# Patient Record
Sex: FEMALE | Race: WHITE | NOT HISPANIC OR LATINO | Employment: UNEMPLOYED | URBAN - NONMETROPOLITAN AREA
[De-identification: names, ages, dates, MRNs, and addresses within clinical notes are randomized per-mention and may not be internally consistent; named-entity substitution may affect disease eponyms.]

---

## 2024-05-18 ENCOUNTER — PREP FOR SURGERY (OUTPATIENT)
Dept: OTHER | Facility: HOSPITAL | Age: 54
End: 2024-05-18

## 2024-05-18 RX ORDER — SODIUM CHLORIDE 0.9 % (FLUSH) 0.9 %
10 SYRINGE (ML) INJECTION AS NEEDED
Status: CANCELLED | OUTPATIENT
Start: 2024-05-18

## 2024-05-18 RX ORDER — ATORVASTATIN CALCIUM 40 MG/1
80 TABLET, FILM COATED ORAL NIGHTLY
Status: CANCELLED | OUTPATIENT
Start: 2024-05-19

## 2024-05-18 RX ORDER — SODIUM CHLORIDE 0.9 % (FLUSH) 0.9 %
10 SYRINGE (ML) INJECTION EVERY 12 HOURS SCHEDULED
Status: CANCELLED | OUTPATIENT
Start: 2024-05-19

## 2024-05-18 RX ORDER — SODIUM CHLORIDE 9 MG/ML
40 INJECTION, SOLUTION INTRAVENOUS AS NEEDED
Status: CANCELLED | OUTPATIENT
Start: 2024-05-18

## 2024-05-18 RX ORDER — ASPIRIN 325 MG
325 TABLET ORAL DAILY
Status: CANCELLED | OUTPATIENT
Start: 2024-05-19

## 2024-05-18 RX ORDER — ASPIRIN 300 MG/1
300 SUPPOSITORY RECTAL DAILY
Status: CANCELLED | OUTPATIENT
Start: 2024-05-19

## 2024-05-19 ENCOUNTER — APPOINTMENT (OUTPATIENT)
Dept: CARDIOLOGY | Facility: HOSPITAL | Age: 54
End: 2024-05-19

## 2024-05-19 ENCOUNTER — APPOINTMENT (OUTPATIENT)
Dept: OTHER | Facility: HOSPITAL | Age: 54
End: 2024-05-19

## 2024-05-19 ENCOUNTER — HOSPITAL ENCOUNTER (INPATIENT)
Facility: HOSPITAL | Age: 54
LOS: 1 days | Discharge: HOME | End: 2024-05-20
Attending: INTERNAL MEDICINE | Admitting: INTERNAL MEDICINE

## 2024-05-19 ENCOUNTER — APPOINTMENT (OUTPATIENT)
Dept: CT IMAGING | Facility: HOSPITAL | Age: 54
End: 2024-05-19

## 2024-05-19 ENCOUNTER — APPOINTMENT (OUTPATIENT)
Dept: MRI IMAGING | Facility: HOSPITAL | Age: 54
End: 2024-05-19

## 2024-05-19 DIAGNOSIS — G45.9 TIA (TRANSIENT ISCHEMIC ATTACK): Primary | ICD-10-CM

## 2024-05-19 PROBLEM — I63.9 CVA (CEREBRAL VASCULAR ACCIDENT): Status: ACTIVE | Noted: 2024-05-19

## 2024-05-19 LAB
ALBUMIN SERPL-MCNC: 4.1 G/DL (ref 3.5–5.2)
ALBUMIN/GLOB SERPL: 1.6 G/DL
ALP SERPL-CCNC: 95 U/L (ref 39–117)
ALT SERPL W P-5'-P-CCNC: 10 U/L (ref 1–33)
ANION GAP SERPL CALCULATED.3IONS-SCNC: 9.1 MMOL/L (ref 5–15)
AST SERPL-CCNC: 12 U/L (ref 1–32)
BASOPHILS # BLD AUTO: 0.04 10*3/MM3 (ref 0–0.2)
BASOPHILS NFR BLD AUTO: 0.7 % (ref 0–1.5)
BH CV ECHO MEAS - AO MAX PG: 13.2 MMHG
BH CV ECHO MEAS - AO MEAN PG: 6 MMHG
BH CV ECHO MEAS - AO ROOT DIAM: 2.7 CM
BH CV ECHO MEAS - AO V2 MAX: 182 CM/SEC
BH CV ECHO MEAS - AO V2 VTI: 38.7 CM
BH CV ECHO MEAS - AVA(I,D): 2.01 CM2
BH CV ECHO MEAS - EDV(CUBED): 68.4 ML
BH CV ECHO MEAS - EDV(MOD-SP4): 67 ML
BH CV ECHO MEAS - EF(MOD-SP4): 70.1 %
BH CV ECHO MEAS - ESV(CUBED): 13.3 ML
BH CV ECHO MEAS - ESV(MOD-SP4): 20 ML
BH CV ECHO MEAS - FS: 42.1 %
BH CV ECHO MEAS - IVS/LVPW: 0.82 CM
BH CV ECHO MEAS - IVSD: 0.63 CM
BH CV ECHO MEAS - LA DIMENSION: 3.1 CM
BH CV ECHO MEAS - LV DIASTOLIC VOL/BSA (35-75): 40.6 CM2
BH CV ECHO MEAS - LV MASS(C)D: 81.7 GRAMS
BH CV ECHO MEAS - LV MAX PG: 4.2 MMHG
BH CV ECHO MEAS - LV MEAN PG: 2 MMHG
BH CV ECHO MEAS - LV SYSTOLIC VOL/BSA (12-30): 12.1 CM2
BH CV ECHO MEAS - LV V1 MAX: 102 CM/SEC
BH CV ECHO MEAS - LV V1 VTI: 22.5 CM
BH CV ECHO MEAS - LVIDD: 4.1 CM
BH CV ECHO MEAS - LVIDS: 2.37 CM
BH CV ECHO MEAS - LVOT AREA: 3.5 CM2
BH CV ECHO MEAS - LVOT DIAM: 2.1 CM
BH CV ECHO MEAS - LVPWD: 0.77 CM
BH CV ECHO MEAS - MV A MAX VEL: 56.6 CM/SEC
BH CV ECHO MEAS - MV DEC SLOPE: 306 CM/SEC2
BH CV ECHO MEAS - MV DEC TIME: 0.28 SEC
BH CV ECHO MEAS - MV E MAX VEL: 84.4 CM/SEC
BH CV ECHO MEAS - MV E/A: 1.49
BH CV ECHO MEAS - PA ACC TIME: 0.2 SEC
BH CV ECHO MEAS - SV(LVOT): 77.9 ML
BH CV ECHO MEAS - SV(MOD-SP4): 47 ML
BH CV ECHO MEAS - SVI(LVOT): 47.2 ML/M2
BH CV ECHO MEAS - SVI(MOD-SP4): 28.5 ML/M2
BH CV ECHO MEAS - TAPSE (>1.6): 3.2 CM
BH CV ECHO MEAS - TR MAX PG: 26 MMHG
BH CV ECHO MEAS - TR MAX VEL: 255 CM/SEC
BILIRUB SERPL-MCNC: 0.2 MG/DL (ref 0–1.2)
BUN SERPL-MCNC: 11 MG/DL (ref 6–20)
BUN/CREAT SERPL: 13.1 (ref 7–25)
CALCIUM SPEC-SCNC: 9.1 MG/DL (ref 8.6–10.5)
CHLORIDE SERPL-SCNC: 108 MMOL/L (ref 98–107)
CHOLEST SERPL-MCNC: 198 MG/DL (ref 0–200)
CO2 SERPL-SCNC: 24.9 MMOL/L (ref 22–29)
CREAT SERPL-MCNC: 0.84 MG/DL (ref 0.57–1)
CRP SERPL-MCNC: 0.35 MG/DL (ref 0–0.5)
DEPRECATED RDW RBC AUTO: 41.4 FL (ref 37–54)
EGFRCR SERPLBLD CKD-EPI 2021: 81.7 ML/MIN/1.73
EOSINOPHIL # BLD AUTO: 0.15 10*3/MM3 (ref 0–0.4)
EOSINOPHIL NFR BLD AUTO: 2.5 % (ref 0.3–6.2)
ERYTHROCYTE [DISTWIDTH] IN BLOOD BY AUTOMATED COUNT: 12.6 % (ref 12.3–15.4)
ERYTHROCYTE [SEDIMENTATION RATE] IN BLOOD: 13 MM/HR (ref 0–30)
GLOBULIN UR ELPH-MCNC: 2.5 GM/DL
GLUCOSE BLDC GLUCOMTR-MCNC: 153 MG/DL (ref 70–130)
GLUCOSE BLDC GLUCOMTR-MCNC: 90 MG/DL (ref 70–130)
GLUCOSE BLDC GLUCOMTR-MCNC: 93 MG/DL (ref 70–130)
GLUCOSE SERPL-MCNC: 178 MG/DL (ref 65–99)
HBA1C MFR BLD: 5.6 % (ref 4.8–5.6)
HCT VFR BLD AUTO: 40.3 % (ref 34–46.6)
HDLC SERPL-MCNC: 38 MG/DL (ref 40–60)
HGB BLD-MCNC: 13.2 G/DL (ref 12–15.9)
IMM GRANULOCYTES # BLD AUTO: 0.04 10*3/MM3 (ref 0–0.05)
IMM GRANULOCYTES NFR BLD AUTO: 0.7 % (ref 0–0.5)
LDLC SERPL CALC-MCNC: 134 MG/DL (ref 0–100)
LDLC/HDLC SERPL: 3.46 {RATIO}
LYMPHOCYTES # BLD AUTO: 2.01 10*3/MM3 (ref 0.7–3.1)
LYMPHOCYTES NFR BLD AUTO: 33.5 % (ref 19.6–45.3)
MCH RBC QN AUTO: 29.1 PG (ref 26.6–33)
MCHC RBC AUTO-ENTMCNC: 32.8 G/DL (ref 31.5–35.7)
MCV RBC AUTO: 88.8 FL (ref 79–97)
MONOCYTES # BLD AUTO: 0.22 10*3/MM3 (ref 0.1–0.9)
MONOCYTES NFR BLD AUTO: 3.7 % (ref 5–12)
NEUTROPHILS NFR BLD AUTO: 3.54 10*3/MM3 (ref 1.7–7)
NEUTROPHILS NFR BLD AUTO: 58.9 % (ref 42.7–76)
NRBC BLD AUTO-RTO: 0 /100 WBC (ref 0–0.2)
PLATELET # BLD AUTO: 179 10*3/MM3 (ref 140–450)
PMV BLD AUTO: 11.1 FL (ref 6–12)
POTASSIUM SERPL-SCNC: 3.8 MMOL/L (ref 3.5–5.2)
PROT SERPL-MCNC: 6.6 G/DL (ref 6–8.5)
QT INTERVAL: 480 MS
QTC INTERVAL: 446 MS
RBC # BLD AUTO: 4.54 10*6/MM3 (ref 3.77–5.28)
SODIUM SERPL-SCNC: 142 MMOL/L (ref 136–145)
TRIGL SERPL-MCNC: 142 MG/DL (ref 0–150)
TSH SERPL DL<=0.05 MIU/L-ACNC: 2.08 UIU/ML (ref 0.27–4.2)
VLDLC SERPL-MCNC: 26 MG/DL (ref 5–40)
WBC NRBC COR # BLD AUTO: 6 10*3/MM3 (ref 3.4–10.8)

## 2024-05-19 PROCEDURE — 82948 REAGENT STRIP/BLOOD GLUCOSE: CPT

## 2024-05-19 PROCEDURE — 85025 COMPLETE CBC W/AUTO DIFF WBC: CPT | Performed by: INTERNAL MEDICINE

## 2024-05-19 PROCEDURE — 93306 TTE W/DOPPLER COMPLETE: CPT

## 2024-05-19 PROCEDURE — 86140 C-REACTIVE PROTEIN: CPT | Performed by: INTERNAL MEDICINE

## 2024-05-19 PROCEDURE — 82746 ASSAY OF FOLIC ACID SERUM: CPT | Performed by: INTERNAL MEDICINE

## 2024-05-19 PROCEDURE — 94799 UNLISTED PULMONARY SVC/PX: CPT

## 2024-05-19 PROCEDURE — 99223 1ST HOSP IP/OBS HIGH 75: CPT | Performed by: INTERNAL MEDICINE

## 2024-05-19 PROCEDURE — 80061 LIPID PANEL: CPT | Performed by: INTERNAL MEDICINE

## 2024-05-19 PROCEDURE — 80053 COMPREHEN METABOLIC PANEL: CPT | Performed by: INTERNAL MEDICINE

## 2024-05-19 PROCEDURE — 93005 ELECTROCARDIOGRAM TRACING: CPT | Performed by: INTERNAL MEDICINE

## 2024-05-19 PROCEDURE — 82607 VITAMIN B-12: CPT | Performed by: INTERNAL MEDICINE

## 2024-05-19 PROCEDURE — 70551 MRI BRAIN STEM W/O DYE: CPT

## 2024-05-19 PROCEDURE — 83036 HEMOGLOBIN GLYCOSYLATED A1C: CPT | Performed by: INTERNAL MEDICINE

## 2024-05-19 PROCEDURE — 85652 RBC SED RATE AUTOMATED: CPT | Performed by: INTERNAL MEDICINE

## 2024-05-19 PROCEDURE — 84443 ASSAY THYROID STIM HORMONE: CPT | Performed by: INTERNAL MEDICINE

## 2024-05-19 PROCEDURE — 93010 ELECTROCARDIOGRAM REPORT: CPT | Performed by: INTERNAL MEDICINE

## 2024-05-19 PROCEDURE — 99222 1ST HOSP IP/OBS MODERATE 55: CPT | Performed by: PSYCHIATRY & NEUROLOGY

## 2024-05-19 PROCEDURE — 70450 CT HEAD/BRAIN W/O DYE: CPT

## 2024-05-19 PROCEDURE — 94761 N-INVAS EAR/PLS OXIMETRY MLT: CPT

## 2024-05-19 PROCEDURE — 93306 TTE W/DOPPLER COMPLETE: CPT | Performed by: INTERNAL MEDICINE

## 2024-05-19 RX ORDER — SODIUM CHLORIDE 0.9 % (FLUSH) 0.9 %
10 SYRINGE (ML) INJECTION AS NEEDED
Status: DISCONTINUED | OUTPATIENT
Start: 2024-05-19 | End: 2024-05-20 | Stop reason: HOSPADM

## 2024-05-19 RX ORDER — BUTALBITAL, ACETAMINOPHEN AND CAFFEINE 50; 325; 40 MG/1; MG/1; MG/1
1 TABLET ORAL EVERY 4 HOURS PRN
Status: COMPLETED | OUTPATIENT
Start: 2024-05-19 | End: 2024-05-19

## 2024-05-19 RX ORDER — BUTALBITAL, ACETAMINOPHEN AND CAFFEINE 50; 325; 40 MG/1; MG/1; MG/1
1 TABLET ORAL EVERY 6 HOURS PRN
Status: DISCONTINUED | OUTPATIENT
Start: 2024-05-19 | End: 2024-05-20 | Stop reason: HOSPADM

## 2024-05-19 RX ORDER — SODIUM CHLORIDE 0.9 % (FLUSH) 0.9 %
10 SYRINGE (ML) INJECTION EVERY 12 HOURS SCHEDULED
Status: DISCONTINUED | OUTPATIENT
Start: 2024-05-19 | End: 2024-05-20 | Stop reason: HOSPADM

## 2024-05-19 RX ORDER — ASPIRIN 300 MG/1
300 SUPPOSITORY RECTAL DAILY
Status: DISCONTINUED | OUTPATIENT
Start: 2024-05-20 | End: 2024-05-20

## 2024-05-19 RX ORDER — LISINOPRIL 2.5 MG/1
5 TABLET ORAL
Status: DISCONTINUED | OUTPATIENT
Start: 2024-05-19 | End: 2024-05-20 | Stop reason: HOSPADM

## 2024-05-19 RX ORDER — ACETAMINOPHEN 500 MG
1000 TABLET ORAL ONCE
Status: COMPLETED | OUTPATIENT
Start: 2024-05-19 | End: 2024-05-19

## 2024-05-19 RX ORDER — FAMOTIDINE 20 MG/1
20 TABLET, FILM COATED ORAL
Status: DISCONTINUED | OUTPATIENT
Start: 2024-05-19 | End: 2024-05-20 | Stop reason: HOSPADM

## 2024-05-19 RX ORDER — ASPIRIN 325 MG
325 TABLET ORAL DAILY
Status: DISCONTINUED | OUTPATIENT
Start: 2024-05-20 | End: 2024-05-20

## 2024-05-19 RX ORDER — FAMOTIDINE 10 MG/ML
20 INJECTION, SOLUTION INTRAVENOUS DAILY
Status: DISCONTINUED | OUTPATIENT
Start: 2024-05-19 | End: 2024-05-19

## 2024-05-19 RX ORDER — ATORVASTATIN CALCIUM 40 MG/1
80 TABLET, FILM COATED ORAL NIGHTLY
Status: DISCONTINUED | OUTPATIENT
Start: 2024-05-19 | End: 2024-05-20 | Stop reason: HOSPADM

## 2024-05-19 RX ORDER — LISINOPRIL 2.5 MG/1
5 TABLET ORAL
Status: DISCONTINUED | OUTPATIENT
Start: 2024-05-19 | End: 2024-05-19

## 2024-05-19 RX ORDER — SODIUM CHLORIDE 9 MG/ML
40 INJECTION, SOLUTION INTRAVENOUS AS NEEDED
Status: DISCONTINUED | OUTPATIENT
Start: 2024-05-19 | End: 2024-05-20 | Stop reason: HOSPADM

## 2024-05-19 RX ORDER — AMLODIPINE BESYLATE 5 MG/1
5 TABLET ORAL
Status: DISCONTINUED | OUTPATIENT
Start: 2024-05-19 | End: 2024-05-20 | Stop reason: HOSPADM

## 2024-05-19 RX ADMIN — FAMOTIDINE 20 MG: 20 TABLET, FILM COATED ORAL at 17:54

## 2024-05-19 RX ADMIN — ACETAMINOPHEN 1000 MG: 500 TABLET ORAL at 06:43

## 2024-05-19 RX ADMIN — Medication 10 ML: at 20:20

## 2024-05-19 RX ADMIN — FAMOTIDINE 20 MG: 20 TABLET, FILM COATED ORAL at 06:43

## 2024-05-19 RX ADMIN — Medication 10 ML: at 08:32

## 2024-05-19 RX ADMIN — LISINOPRIL 5 MG: 2.5 TABLET ORAL at 20:20

## 2024-05-19 RX ADMIN — ATORVASTATIN CALCIUM 80 MG: 40 TABLET, FILM COATED ORAL at 20:20

## 2024-05-19 RX ADMIN — AMLODIPINE BESYLATE 5 MG: 5 TABLET ORAL at 17:54

## 2024-05-19 RX ADMIN — BUTALBITAL, ACETAMINOPHEN AND CAFFEINE 1 TABLET: 325; 50; 40 TABLET ORAL at 12:03

## 2024-05-19 NOTE — PROGRESS NOTES
Patient acutely had blurry vision, slurred speech, worse left sided weakness while sitting in bedside chair. Blood pressure had acutely shot up to 170's/110's. Code stroke called. STAT CT and recently obtained MRI reviewed with tele neruology. Both exams did not show ischemic stroke or hemorrhage. Possibly related to BP. No headache to suspect migraine. Will start amlodipine with goal -140 systolic per neuro. Currently 134/86. Symptoms resolved while gone to CT with exam returned to same as this morning.

## 2024-05-19 NOTE — PROGRESS NOTES
Patient admitted after midnight by Dr. Celaya. I have seen and evaluated patient this morning.     Ms. Meyers is our 53 yo F with hx COPD, HTN, tobacco use, distant hx of leukemia in remission who presented with left sided weakness, vision changes starting around 5 pm yesterday. She presented to OSH and received TNK per neurology recommendations around 6:30 pm. Patient also significantly hypertensive at OSH on presentation. Patient with headache as well. Headache initially resolved but has returned less in severity. Blood pressure better controlled. History consistent with right sided CVA. Would suspect complex migraine associated weakness to improve with improved migraine and not be as persistent. History also not consistent with hypertensive encephalopathy, but suspect uncontrolled HTN did put patient at increased risk for CVA. CT/CTA being uploaded currently for our tele-neurology to review at their request. We have repeat labs and CT pending for 6:30 pm. Neurology consulted, appreciate recs. Will continue to monitor in CCU as per post-TNK protocol. PT/OT consulted and will evaluate patient tomorrow. On my evaluation today patient still with similar weakness on left side, 3-5/5 in upper and lower extremities, to prior exams. L sided facial droop very mild. Continue neuro and NIHSS as per post-TNK protocol.

## 2024-05-19 NOTE — PROGRESS NOTES
Nurse Practitioner - Brief Progress Note  PERMANENT  05/19/2024 03:38    [facility] [place] - [facility] - [place], [place] ([facility])    DONALD DERAS    Date of Service 05/19/2024 03:38    HPI/Events of Note [facility] Provider Assessment Note    54-year-old female admitted status post TNK for presumptive ischemic CVA.  Originally presented to an outside hospital with complaints near-syncope and left arm weakness.  Found to have left pronator drift, NIH 5 at OSH.  PMH:  Asthma/COPD, HTN    Hospital course:  Patient was given TNK at OSH with some symptom improvement.  CT head unremarkable, discussed with tele neurology.    On video assessment, the patient is HURD, no acute distress.  Resting comfortably    Assessment and Plan:    --presumptive ischemic CVA  S/P TNK administration  Serial neuro exams  Echocardiogram, repeat CT brain  Tele neurology to see  Bleeding precautions      _y_   Video Assessment performed  _y_   Most recent labs reviewed  _y_   Vital Signs reviewed  _y_   Best Practices addressed:                 VTE prophylaxis:scd                 SUP (when indicated):                 Current Glucose:265                      Please notify bedside physician when present or [facility] if glc > 180 X 2                 Sepsis guidelines: n/a                 Lung protective strategy: n/a                 Targeted Temperature Management:n/a    Note drafted by SIMA Day-BC    Contact [facility] for any needs if bedside physician is not present.      Interventions Intermediate-Other: presumptive ischemic CVA - eval/mangement        Electronically Signed by: Yunior Gibson (NP) on 05/19/2024 04:00    Annotated By: Giovanni Neri)    Date: 05/19/24 04:27  Agree with assessment and plan as outlined above

## 2024-05-19 NOTE — NURSING NOTE
Pt called out with new complaints of blurred vision.  Pt had worsening left sided weakness and was aphasic on assessment. Blood glucose was obtained and code stroke was called.  Dr. Flores and stroke team at bedside at 1645.

## 2024-05-19 NOTE — PLAN OF CARE
Problem: Adult Inpatient Plan of Care  Goal: Plan of Care Review  Outcome: Ongoing, Progressing  Flowsheets (Taken 5/19/2024 0542)  Progress: no change  Plan of Care Reviewed With: patient  Outcome Evaluation: patient alert and oriented. NIHSS scale of 2, no acute distress noted, patient denies any needs. VSS, WCTM  Goal: Patient-Specific Goal (Individualized)  Outcome: Ongoing, Progressing  Goal: Absence of Hospital-Acquired Illness or Injury  Outcome: Ongoing, Progressing  Intervention: Identify and Manage Fall Risk  Recent Flowsheet Documentation  Taken 5/19/2024 0400 by Donald Corral RN  Safety Promotion/Fall Prevention:   fall prevention program maintained   safety round/check completed  Taken 5/19/2024 0300 by Donald Corral RN  Safety Promotion/Fall Prevention:   fall prevention program maintained   safety round/check completed  Taken 5/19/2024 0215 by Donald Corral RN  Safety Promotion/Fall Prevention:   fall prevention program maintained   safety round/check completed  Intervention: Prevent Skin Injury  Recent Flowsheet Documentation  Taken 5/19/2024 0400 by Donald Corral RN  Body Position: position changed independently  Taken 5/19/2024 0215 by Donald Corral RN  Body Position: (education provided regarding importance of turning and repositioning to maintain skin integerity, patient reports understanding)   position changed independently   other (see comments)  Skin Protection:   adhesive use limited   incontinence pads utilized   skin-to-device areas padded   skin-to-skin areas padded   transparent dressing maintained   tubing/devices free from skin contact  Intervention: Prevent and Manage VTE (Venous Thromboembolism) Risk  Recent Flowsheet Documentation  Taken 5/19/2024 0215 by Donald Corral RN  Activity Management: bedrest  VTE Prevention/Management:   bilateral   sequential compression devices on  Intervention: Prevent Infection  Recent Flowsheet Documentation  Taken 5/19/2024 0215 by Ellis  Donald, RN  Infection Prevention:   equipment surfaces disinfected   hand hygiene promoted   personal protective equipment utilized   rest/sleep promoted   single patient room provided   visitors restricted/screened  Goal: Optimal Comfort and Wellbeing  Outcome: Ongoing, Progressing  Intervention: Monitor Pain and Promote Comfort  Recent Flowsheet Documentation  Taken 5/19/2024 0215 by Donald Corral, RN  Pain Management Interventions:   care clustered   pillow support provided   position adjusted   see MAR   quiet environment facilitated  Intervention: Provide Person-Centered Care  Recent Flowsheet Documentation  Taken 5/19/2024 0215 by Donald Corral, RN  Trust Relationship/Rapport:   care explained   choices provided  Goal: Readiness for Transition of Care  Outcome: Ongoing, Progressing  Intervention: Mutually Develop Transition Plan  Recent Flowsheet Documentation  Taken 5/19/2024 0310 by Donald Corral, RN  Transportation Anticipated: family or friend will provide  Patient/Family Anticipated Services at Transition: none  Patient/Family Anticipates Transition to: home  Taken 5/19/2024 0303 by Donald Corral, RN  Equipment Currently Used at Home:   oxygen   pulse ox   bp cuff   cane, straight   nebulizer   Goal Outcome Evaluation:  Plan of Care Reviewed With: patient        Progress: no change  Outcome Evaluation: patient alert and oriented. NIHSS scale of 2, no acute distress noted, patient denies any needs. VSS, WCTM

## 2024-05-19 NOTE — CONSULTS
Stroke Consult Note    Patient Name: Diane Meyers   MRN: [id number]  Age: 54 y.o.  Sex: female  : 1970    Primary Care Physician: Provider, No Known  Referring Physician:  Chevy Betancourt MD        Chief Complaint/Reason for Consultation: left sided weakness     Subjective .  HPI: 54-year-old female admitted status post TNK for presumptive ischemic CVA. Originally presented to an outside hospital with complaints near-syncope and left arm weakness. Found to have left pronator drift, NIH 5 at OSH.     At work, left arm dropped to her side, no feeling, vision blurred, she is talking but was not making any sense.       I was called on  for transfer. Based on the history    Last Known Normal Date/Time:   5 PM EST     Review of Systems   Constitutional: No fatigue  HENT: Negative for nosebleeds and rhinorrhea.    Eyes: Negative for redness.   Respiratory: Negative for cough.    Gastrointestinal: Negative for anal bleeding.   Endocrine: Negative for polydipsia.   Genitourinary: Negative for enuresis and urgency.   Musculoskeletal: Negative for joint swelling.   Neurological: Negative for tremors.   Psychiatric/Behavioral: Negative for hallucinations.      Temp:  [97.9 °F (36.6 °C)-98.1 °F (36.7 °C)] 97.9 °F (36.6 °C)  Heart Rate:  [48-64] 54  Resp:  [12-20] 16  BP: (133-154)/(68-92) 148/77        NEURO( Exam is performed with help of hospital staff at bed side and observed by me via audio-video interface)    MENTAL STATUS: AAOx3, memory intact, fund of knowledge appropriate    LANG/SPEECH: Naming and repetition intact, fluent, follows 3-step commands    CRANIAL NERVES:    Pupils equal and reactive, EOMI intact, no gaze deviation, no nystagmus  No facial droop, cough and gag intact, shoulder shrug intact, tongue midline     MOTOR:  Moves all extremities equally- subjective feels weak- left arm -  less     SENSORY: Normal to light touch all throughout     COORDINATION: incoordination on the  left.      STATION: Not assessed due to patient condition    GAIT: Not assessed due to patient condition     Acute Stroke Data    Thrombolytic Inclusion / Exclusion Criteria    Time: 09:36 EDT  Person Administering Scale: Jefferson Bo MD    Inclusion Criteria  [x]   18 years of age or greater   []   Onset of symptoms < 4.5 hours before beginning treatment (stroke onset = time patient was last seen well or without symptoms).   []   Diagnosis of acute ischemic stroke causing measurable disabling deficit (Complete Hemianopia, Any Aphasia, Visual or Sensory Extinction, Any weakness limiting sustained effort against gravity)   []   Any remaining deficit considered potentially disabling in view of patient and practitioner   Exclusion criteria (Do not proceed with thrombolytic if any are checked under exclusion criteria)  []   Onset unknown or GREATER than 4.5 hours   []   ICH on CT/MRI   []   CT demonstrates hypodensity representing acute or subacute infarct   []   Significant head trauma or prior stroke in the previous 3 months   []   Symptoms suggestive of subarachnoid hemorrhage   []   History of un-ruptured intracranial aneurysm GREATER than 10 mm   []   Recent intracranial or intraspinal surgery within the last 3 months   []   Arterial puncture at a non-compressible site in the previous 7 days   []   Active internal bleeding   []   Acute bleeding tendency   []   Platelet count LESS than 100,000 for known hematological diseases such as leukemia, thrombocytopenia or chronic cirrhosis   []   Current use of anticoagulant with INR GREATER than 1.7 or PT GREATER than 15 seconds, aPTT GREATER than 40 seconds   []   Heparin received within 48 hours, resulting in abnormally elevated aPTT GREATER than upper limit of normal   []   Current use of direct thrombin inhibitors or direct factor Xa inhibitors in the past 48 hours   []   Elevated blood pressure refractory to treatment (systolic GREATER than 185 mm/Hg or diastolic   GREATER than 110 mm/Hg   []   Suspected infective endocarditis and aortic arch dissection   []   Current use of therapeutic treatment dose of low-molecular-weight heparin (LMWH) within the previous 24 hours   []   Structural GI malignancy or bleed   Relative exclusion for all patients  []   Only minor non-disabling symptoms   []   Pregnancy   []   Seizure at onset with postictal residual neurological impairments   []   Major surgery or previous trauma within past 14 days   []   History of previous spontaneous ICH, intracranial neoplasm, or AV malformation   []   Postpartum (within previous 14 days)   []   Recent GI or urinary tract hemorrhage (within previous 21 days)   []   Recent acute MI (within previous 3 months)   []   History of un-ruptured intracranial aneurysm LESS than 10 mm   []   History of ruptured intracranial aneurysm   []   Blood glucose LESS than 50 mg/dL (2.7 mmol/L)   []   Dural puncture within the last 7 days   []   Known GREATER than 10 cerebral microbleeds   Additional exclusions for patients with symptoms onset between 3 and 4.5 hours.  []   Age > 80.   []   On any anticoagulants regardless of INR  >>> Warfarin (Coumadin), Heparin, Enoxaparin (Lovenox), fondaparinux (Arixtra), bivalirudin (Angiomax), Argatroban, dabigatran (Pradaxa), rivaroxaban (Xarelto), or apixaban (Eliquis)   []   Severe stroke (NIHSS > 25).   []   History of BOTH diabetes and previous ischemic stroke.   []   The risks and benefits have been discussed with the patient or family related to the administration of IV thrombolytic for stroke symptoms.   []   I have discussed and reviewed the patient's case and imaging with the attending prior to IV thrombolytic.    Time thrombolytic administered     TNK given at OSH  Past Medical History:   Diagnosis Date    Asthma     COPD (chronic obstructive pulmonary disease)     Hypertension      Past Surgical History:   Procedure Laterality Date    APPENDECTOMY       SECTION       "x2     Family History   Problem Relation Age of Onset    Hypertension Father      Social History     Socioeconomic History    Marital status: Single   Tobacco Use    Smoking status: Every Day     Current packs/day: 1.00     Average packs/day: 1 pack/day for 39.4 years (39.4 ttl pk-yrs)     Types: Cigarettes     Start date:      Passive exposure: Current    Smokeless tobacco: Never   Vaping Use    Vaping status: Never Used   Substance and Sexual Activity    Alcohol use: Never    Drug use: Yes     Frequency: 2.0 times per week     Types: Marijuana     Comment: \"gummies for anxiety\"    Sexual activity: Defer     No Known Allergies  Prior to Admission medications    Not on File       Hospital Meds:  Scheduled- [START ON 2024] aspirin, 325 mg, Oral, Daily   Or  [START ON 2024] aspirin, 300 mg, Rectal, Daily  atorvastatin, 80 mg, Oral, Nightly  famotidine, 20 mg, Oral, BID AC  sodium chloride, 10 mL, Intravenous, Q12H  sodium chloride, 10 mL, Intravenous, Q12H      Infusions-     PRNs-   ipratropium    sodium chloride    sodium chloride    sodium chloride    sodium chloride    Functional Status Prior to Current Stroke/[place] Score: 0    NIH Stroke Scale  Time: 09:36 EDT  Person Administering Scale: Jefferson Bo MD    1a  Level of consciousness: 0=alert; keenly responsive   1b. LOC questions:  0=Performs both tasks correctly   1c. LOC commands: 0=Performs both tasks correctly   2.  Best Gaze: 0=normal   3.  Visual: 0=No visual loss   4. Facial Palsy: 0=Normal symmetric movement   5a.  Motor left arm: 1=Drift, limb holds 90 (or 45) degrees but drifts down before full 10 seconds: does not hit bed   5b.  Motor right arm: 0=No drift, limb holds 90 (or 45) degrees for full 10 seconds   6a. motor left le=No drift, limb holds 90 (or 45) degrees for full 10 seconds   6b  Motor right le=No drift, limb holds 90 (or 45) degrees for full 10 seconds   7. Limb Ataxia: 1=Present in one limb   8.  Sensory: " 0=Normal; no sensory loss   9. Best Language:  0=No aphasia, normal   10. Dysarthria: 0=Normal   11. Extinction and Inattention: 0=No abnormality    Total:   2       Results Reviewed:  I have personally reviewed current lab, radiology, and data and agree with results.        Assessment/Plan:  Left sided weakness s/p TNK at OSH-transferred to [place] for further management.- localizable to right cerebral hemisphere.   Tobacco abuse- counseled on cessation, willing to quit.   Hypertension- new diagnosis   Hyperlipidemia       Plan     CTA- mild intra and extracranial athero   Okay for aspirin 325 after 24 hr stable CT scan   Okay for Lipitor 80 daily  Systolic BP - 120-160  Mobilize the patient   MRI brain and TTE pending.       Likely discharge tomorrow.   Tele-stroke will continue to follow the patient.           Jefferson Bo MD  May 19, 2024  09:36 EDT    Verbal consent taken.  Patient agreeable to be seen via telemedicine.    This was an audio and video enabled telemedicine encounter.

## 2024-05-19 NOTE — PLAN OF CARE
Goal Outcome Evaluation:           Progress: no change  Outcome Evaluation: Pt resting in bed. Remains alert and oriented. Pt states that blurred vision has improved & is no longer aphasic.  Pt does still have left sided weakness but has improved.  VSS.  No current complaints at this time.

## 2024-05-19 NOTE — H&P
"[facility]   HISTORY AND PHYSICAL    Patient Name: Diane Meyers  : 1970  MRN: [id number]  Primary Care Physician:  Mychal, No Known  Date of admission: 2024    Subjective   Subjective     Chief Complaint: L weakness, aphasia    History of Present Illness the patient is a 54-year-old female, left-hand-dominant, with past medical history significant for COPD, hypertension on no home medications and tobacco use who presents in transfer for left-sided weakness, visual changes and aphasia.    Patient seen and examined in CCU bed 2.  The patient states that she reported for work on 2024 at 4 PM.  She states that at 5 PM she was sweeping with a broom and had the acute onset of left upper extremity weakness.  She states that she felt as if her left arm was \"dead.\"  She dropped the broom and had complete loss of function of the left hand and left upper extremity.  She then reports bilateral visual loss.  She states that it was as she was looking through a kaleidoscope.  She states that she also had some expressive aphasia as she was trying to explain to her boss what was happening and her boss did not understand what she was saying.  The patient states that the symptoms lasted in total for approximately 7 to 10 minutes.  Since that time, the patient has had ongoing but stable left-sided weakness.    Patient states that on the morning of 2024 she did wake up with what she describes as a migraine headache.  She states that she does not routinely have headaches.  She states that either prior to or during the left upper extremity weakness, she did experience tendinitis and dizziness as well.  The patient states that she attempted to get low to the ground because she did not want to fall.    Patient denies any previous/similar symptoms.  She denies any recent fevers or chills.  No nausea or vomiting, shortness of air or chest pains.  The patient denies any history of syncope.    According to record " review from the outside hospital, patient presented with the abrupt onset of tinnitus, weakness and feeling as if she would collapse/near syncope.  Patient also complained of left arm weakness and bitemporal blurry vision.  Patient had reported that she was having difficulty speaking at work and reported difficulty using her left arm/hand. Symptoms reportedly lasted approximately 5 minutes.  Patient reported that she did wake up with a headache yesterday morning but it had since resolved.  The patient does not take anticoagulants at home.    According to outside hospital ED provider documentation, patient was identified to have a left pronator drift, decreased left  strength and decreased sensation in the left upper extremity with an NIH scale of 5.    Vital signs upon arrival to the outside facility revealed a blood pressure of 194/118 mmHg, heart rate 90, respirations 20 and temperature 98.1 °F.    According to my discussion with the outside ED provider, the patient received an IV antihypertensive medication which revealed a repeat blood pressure of 150/70 mmHg.    Review of Systems   Constitutional:  Negative for chills, diaphoresis and fever.   HENT:  Positive for tinnitus (Currently resolved). Negative for hearing loss and trouble swallowing.    Eyes:  Positive for visual disturbance (Currently resolved). Negative for photophobia and discharge.   Respiratory:  Negative for cough, shortness of breath and wheezing.    Cardiovascular:  Negative for chest pain, palpitations and leg swelling.   Gastrointestinal:  Negative for abdominal pain, constipation, diarrhea, nausea and vomiting.   Endocrine: Negative for polydipsia, polyphagia and polyuria.   Genitourinary:  Negative for dysuria, frequency and hematuria.   Musculoskeletal:  Negative for gait problem, myalgias and neck pain.   Skin:  Negative for rash.   Neurological:  Positive for dizziness (Currently resolved) and weakness. Negative for tremors,  seizures, syncope, speech difficulty (Resolved) and light-headedness.   Hematological:  Does not bruise/bleed easily.   Psychiatric/Behavioral:  Negative for agitation and confusion.         Personal History     Past Medical History:   Diagnosis Date    Asthma     COPD (chronic obstructive pulmonary disease)     Hypertension        Past Surgical History:   Procedure Laterality Date    APPENDECTOMY       SECTION         Family History: family history is not on file.     Social History:  reports that she has been smoking cigarettes. She started smoking about 39 years ago. She has a 39.4 pack-year smoking history. She has been exposed to tobacco smoke. She has never used smokeless tobacco. She reports current drug use. Frequency: 2.00 times per week. Drug: Marijuana. She reports that she does not drink alcohol.    Home Medications:  None    Allergies:  No Known Allergies    Objective    Objective     Vitals:   /92 mmHg, heart rate 52, respirations 12, temperature 98 °F, O2 saturation 97% via room air    Physical Exam  Constitutional:       General: She is not in acute distress.     Appearance: She is well-developed.   HENT:      Head: Normocephalic and atraumatic.   Eyes:      General: No visual field deficit.     Conjunctiva/sclera: Conjunctivae normal.   Neck:      Vascular: No carotid bruit or JVD.      Trachea: No tracheal deviation.   Cardiovascular:      Rate and Rhythm: Regular rhythm. Bradycardia present.      Pulses:           Dorsalis pedis pulses are 2+ on the right side and 2+ on the left side.      Heart sounds: No murmur heard.     No friction rub. No gallop.   Pulmonary:      Effort: No respiratory distress.      Breath sounds: Normal breath sounds. No wheezing or rales.   Abdominal:      General: Bowel sounds are decreased. There is no distension.      Palpations: Abdomen is soft.      Tenderness: There is no abdominal tenderness. There is no guarding.   Musculoskeletal:         General:  No tenderness. Normal range of motion.      Right lower leg: No edema.      Left lower leg: No edema.   Skin:     General: Skin is warm and dry.      Findings: No erythema or rash.   Neurological:      Mental Status: She is alert and oriented to person, place, and time.      Cranial Nerves: Facial asymmetry (L facial droop) present. No dysarthria.      Motor: Weakness (LUE and LLE; 3+ to 4/5) and pronator drift (L) present. No tremor.         Result Review    Result Review:  I have personally reviewed the results from the time of this admission to 5/19/2024 02:58 EDT and agree with these findings:  []  Laboratory list / accordion  []  Microbiology  []  Radiology  []  EKG/Telemetry   []  Cardiology/Vascular   []  Pathology  [x]  Old records  []  Other:  Most notable findings include:     From Outside Hospital:    EKG performed at outside EKG performed at outside hospital reveals sinus rhythm with a first-degree AV block and incomplete right bundle branch block    CBC with WBC 8.89, H/H 14.6/43.7, plts 209. PT/INR 11/1.04.   Sodium 140, potassium 3.7, chloride 104, carbon dioxide 22.4, BUN 13 with creatinine 1.07, anion gap 13.6, glucose 108.  Troponin I negative.      Assessment / Plan     Brief Patient Summary:  Diane Meyers is a 54 y.o. female who has past medical history significant for hypertension and tobacco use who presents in transfer for left-sided weakness with aphasia and concern for acute CVA status post TNK    #Suspected R CVA, probably ischemic (embolic versus thrombotic) presenting with with left sided weakness, aphasia and bilateral visual changes, proceeded by headache described as migraine type per patient (resolved)  #Essential hypertension, improved  #Tobacco use  -Patient admitted to the critical care unit after receiving TNKase at outside facility at approximately 1830 on 5/18/2024  -LKW was just prior to 1700 on 5/18/24  -Monitor closely for bleeding  -Patient presented to OSH with the acute  "onset of bilateral visual loss (\"kaleidoscope\" per patient), left-sided weakness and expressive aphasia  -According to patient report, the episode lasted approximately 7 to 10 minutes with resolution of visual symptoms, dizziness and expressive aphasia and currently with ongoing but stable left-sided deficits that are overall improved compared to her presentation at outside ED  -Patient did receive IV labetalol prior to TNKase administration  -Patient's current systolic blood pressure reading is in the 150s so I will continue to monitor for now; current SBP goal <180 mmHg  -Continue ICU/post TNKase vital signs as per protocol  -Neurochecks and NIHSS assessments per protocol  -The patient passed her bedside nursing dysphagia screen, I have ordered a cardiac diet  -PT, OT and SLP consultations per stroke protocol  -Repeat CT head without contrast tomorrow morning and/or if neurological decline  -I have requested an echo with bubble study  -Telemetry-neurology has been consulted for further evaluation and recommendations  -Will hold on MRI imaging while awaiting neurology recommendations  -No further lab draws, IM injections, CVL, invasive procedures, Garrett, NG tubes if possible until 24 hours post thrombolytic-->5/19/2024 at 1830  -No Indwelling garrett x 24 hours post TNKase  -I have ordered a CBC, CMP, lipid panel, HgbA1c, C-RP, ESR, vitamin B12, folate and TSH  -Tobacco cessation recommended    Chronic:  -COPD without exacerbation  -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    DVT prophylaxis:  SCDs    CODE STATUS:    Code Status (Patient has no pulse and is not breathing): CPR (Attempt to Resuscitate)  Medical Interventions (Patient has pulse or is breathing): Full Support    Admission Status:  I believe this patient meets inpatient status.    Patient is considered to be high risk due to: Suspected acute CVA status post thrombolytic " therapy    Court Harsh Celaya, DO

## 2024-05-19 NOTE — CONSULTS
[facility]   Teleneurology Note    Patient Name: Diane Meyers  : 1970  MRN: [id number]  Primary Care Physician: Provider, No Known  Referring Site: [facility]   Teleneurology Initial Data     Arrival Date Telestroke Site: 24 Arrival Time Telestroke Site: 020   Neurologist Evaluation Date: 24 Neurologist Evaluation Time: 164   Date Last Known Well: 24 Time Last Known Well: 1630     History     Chief Complaint: This patient has been transferred to the [facility] for acute left-sided symptoms with NIH stroke scale of 5 for which she had received acute thrombolytic therapy and was seen by Dr. Bo this morning and was doing fine.  However around 4:30 PM it was noted that she suddenly had some garbled speech and left-sided weakness.  HPI: I was called by Dr. Bo at 4:39 PM who had already rounded on this patient this morning and she was doing good.  As per Dr. Ignacio tovar her hospitalist the patient's systolic blood pressure shot up to 170 and the diastolic was as high as 100 and around 4:30 PM she started noticing a headache and also started noticing weakness of the left arm and left leg and garbled speech.  Code stroke was performed with a plain CT of the brain that was unremarkable.  A CT angiogram of the head and neck with contrast was already performed at the outside facility early this morning which had shown atherosclerotic vascular disease but no evidence of any retrievable thrombus.  By the time I saw the patient through the telemedicine device she has returned almost back to baseline.  She had a slight headache but there was no facial asymmetry and her speech was clear and her mentation was normal and she is getting ready to eat her dinner.  There is no drift noticeable in the upper extremities and in the lower extremities the patient could lift her left leg up and keep it up in the air for 5 seconds but it felt heavy compared to the right.  No  sensory loss was noted at any time and there was no extinction on double simultaneous presentation of noxious stimulation bilaterally.  No vision changes are noted and currently the NIH stroke scale is barely 1.  I already reviewed the MRI of the brain which was done this afternoon and it did not show any acute restricted diffusion.  As the patient already received tenecteplase yesterday I did not consider any further acute thrombolytic therapy and discussed that once the 24-hour interval from the administration of the acute thrombolytic therapy is over she can be safely receiving the antiplatelet agents as prescribed.    Stroke Risk Factors/ Pertinent Data     Stroke risk factors: smoking, hypertension, headache/ migrane  Anticoagulants prior to arrival: none  Antiplatelets prior to arrival: none  Statins prior to arrival: none     Scoring Scales     Modified Cammie Scale  Pre-Stroke Modified Cammie Scale: 1 - No significant disability despite symptoms.  Able to carry out all usual duties and activities.  Intracerebral Hemmorhage (ICH) Score  [place] Coma Score: 13-15  Age>=80: no  Caitie Coma Scale  Best Eye Response: Spontaneous  Best Verbal Response: Oriented  Best Motor Response: Follows commands  Caitie Coma Scale Score: 15    NIH Stroke Scale     NIHSS Performed Date: 05/19/24 NIHSS Performed Time: 1646   1a. Level of Consciousness: 0-->Alert, keenly responsive  1b. LOC Questions: 0-->Answers both questions correctly  1c. LOC Commands: 0-->Performs both tasks correctly  2. Best Gaze: 0-->Normal  3. Visual: 0-->No visual loss  4. Facial Palsy: 0-->Normal symmetrical movements  5a. Motor Arm, Left: 0-->No drift, limb holds 90 (or 45) degrees for full 10 secs  5b. Motor Arm, Right: 0-->No drift, limb holds 90 (or 45) degrees for full 10 secs  6a. Motor Leg, Left: 0-->No drift, leg holds 30 degree position for full 5 secs  6b. Motor Leg, Right: 0-->No drift, leg holds 30 degree position for full 5 secs  7. Limb  Ataxia: 0-->Absent  8. Sensory: 0-->Normal, no sensory loss  9. Best Language: 0-->No aphasia, normal  10. Dysarthria: 0-->Normal  11. Extinction and Inattention (formerly Neglect): 0-->No abnormality  Total (NIH Stroke Scale): 0     Review of Systems     Review of Systems   Constitutional: Negative.    HENT: Negative.     Eyes: Negative.    Respiratory: Negative.     Cardiovascular: Negative.    Gastrointestinal: Negative.    Endocrine: Negative.    Genitourinary: Negative.    Musculoskeletal: Negative.    Skin: Negative.    Allergic/Immunologic: Negative.    Neurological:  Positive for facial asymmetry, speech difficulty, weakness and headaches.   Psychiatric/Behavioral: Negative.       Patient had presented earlier this morning at an outside facility with an NIH stroke scale of 5 and received acute thrombolytic therapy as per protocol and then transferred here and was seen by Dr. Bo earlier this morning.  By that time the patient was already recovering and was back to baseline.  However later this afternoon her blood pressure suddenly shot up to 170/100 and she started having similar symptoms as before with headache as well as garbled speech and left-sided weakness.  For that reason a repeat CT of the brain was performed that is unremarkable and the patient is feeling better once the blood pressure is coming down and she only feels heavy in the left leg but she is able to pick it up and keep it in the air for 5 seconds without dropping.  Objective   Exam     Exam performed with the help of support staff from the referring site  Neurological Exam  Mental Status  Awake, alert and oriented to person, place and time. Oriented to person, place, time and situation. Recent and remote memory are intact. Speech is normal. Language is fluent with no aphasia. Attention and concentration are normal. Fund of knowledge is appropriate for level of education.    Cranial Nerves  CN I: Sense of smell is normal.  CN II:  Visual acuity is normal. Visual fields full to confrontation.  CN III, IV, VI: Extraocular movements intact bilaterally. Normal lids and orbits bilaterally. Pupils equal round and reactive to light bilaterally.  CN V: Facial sensation is normal.  CN VII: Full and symmetric facial movement.  CN IX, X: Palate elevates symmetrically. Normal gag reflex.  CN XI: Shoulder shrug strength is normal.  CN XII: Tongue midline without atrophy or fasciculations.    Motor  Normal muscle bulk throughout. No fasciculations present. Normal muscle tone. No abnormal involuntary movements. Strength is 5/5 throughout all four extremities.  The left feels heavy compared to the right but she is able to keep it up in the air for the next 5 seconds without dropping..    Sensory  Sensation is intact to light touch, pinprick, vibration and proprioception in all four extremities.    Coordination    Finger-to-nose, rapid alternating movements and heel-to-shin normal bilaterally without dysmetria.    Gait    Gait and Romberg were not tested at this time as she still feels heavy in the left leg and the 24-hour interval from the time the acute thrombolytic therapy was performed is not yet over..    The patient already has passed the bedside swallow and should be able to continue her evening meals as before.  Result Review    Results      Reviewed the recent CT scan of the brain which is not showing any acute hemorrhage or any changes.  Reviewed the MRI of the brain with stroke protocol which is not showing any changes.  Reviewed the CT angiogram of the head and neck with contrast performed at outside facility which is showing a lot of atherosclerotic vascular disease but no hemodynamic compromise.    The echocardiogram performed with a bubble study shows the following:    Left ventricular ejection fraction appears to be 61 - 65%.    Left ventricular diastolic function was normal.    Saline test results are negative for right to left atrial level  shunt.    Estimated right ventricular systolic pressure from tricuspid regurgitation is normal (<35 mmHg).    No significant valvular abnormalities noted.       Personal review of CNS imaging:(Official report by radiologist pending)  Imaging  CT Imaging Review: CT Imaging reviewed, NO acute infarct/ hemorrhage seen  CTA Imaging Review: CTA Imaging reviewed, NO large vessel occlusion or severe stenosis seen    Thrombolytic   Thrombolytics: not applicable     Assessment & Plan   Assessment/ Plan     Assessment:  Acute Stroke Evaluation: Stroke not suspected/ Other (Specify)- the risks of IV thrombolytic outweigh the benefits of treatment       Plan:  Patient has a history of migraines with aura and it seems that she might of had a migraine with aura yesterday.  However she does have risk factors for stroke including smoking and hypertension and for that reason I discussed with Dr. Ignacio tovar that the goal is to keep the systolic blood pressure between 1 20-1 40 and the diastolic between 70 and 80 as the MRI of the brain is completely unremarkable.  I also reviewed the echocardiogram which is not showing any right-to-left atrial level shunt and ejection fraction is normal.  Lab work for tomorrow includes lipid profile, TSH, sedimentation rate, vitamin B12, folic acid level.  Physical therapy/Occupational Therapy will be evaluating her tomorrow to make sure she is safe to go home.  I have also requested carotid ultrasound tomorrow to look for any progressive atherosclerotic vascular disease including soft plaques in the neck.  She should be getting 80 mg of atorvastatin at bedtime.  Once the 24-hour interval for acute thrombolytic therapy is over she should be able to get the antiplatelet agents as prescribed.  She will be followed up tomorrow by the inpatient teleneurology service.         Disposition     Disposition: The patient will remain at the referring institution for further evaluation and management    Medical  Decision Making  Medical Data Reviewed: Data reviewed including: clinical labs, radiology and/or medical tests, Obtaining/ reviewing old medical records, Obtaining case history from another source, Independent review of CNS images  Length of visit: 30 minutes in this critical care evaluation and management of the acute strokelike symptoms in the ICU setting    Michael RODRÍGUEZ MD, saw the patient on 05/19/24 at 1646 for an initial in-patient or emergency room telememedicine face to face consult using interactive technology for 30 minutes. The location of the patient was [place]. I was located at [facility] .    I have proceeded with this evaluation at the request of the referring practitioner as it is felt to be an emergency setting and no appropriate specialist is available to perform this evaluation. The originating hospital has reported that this is the correct patient and has obtained consent from the patient/surrogate to perform this telemedicine evaluation(including obtaining history, performing examination and reviewing data provided by the patient an/or originating site of care provider)    I have introduced myself to the patient, provided my credentials, disclosed my location, and determined that, based on review of the patient's chart and discussion with the patient's primary team, telemedicine via a HIPAA compliant, real-time, face-to-face two-way, interactive audio and video platform is an appropriate and effective means of providing the service.    The patient/surrogate has a right to refuse this evaluation as they have been explained risks including potential loss of confidentiality, benefits, alternatives, and the potential need for subsequent face-to-face care. In this evaluation, we will be providing recommendations only.  The ultimate decision to follow or not to follow these recommendations will be left to the bedside treating/requesting practitioner.    The patient/surrogate has  been notified that other healthcare professionals including technical person may be involved in this A/V evaluation.  All laws concerning confidentiality and patient access to medical records and copies of medical records apply to telemedicine.  The patient/surrogate has received the originating site's Health Notice of Privacy Practices.    Michael Sanchez MD

## 2024-05-19 NOTE — LETTER
May 20, 2024      [facility]  [address] [place] [id number]  [phone]          Patient: Diane Meyers   YOB: 1970   Date of Visit: 5/18/2024       To Whom It May Concern:    Diane Meyers was seen at [facility] on 5/18/2024 - 5/20/24.    Please excuse  her  from work 5/18 through 5/20/24.        Sincerely,       Abilio Ruiz MD

## 2024-05-20 ENCOUNTER — TELEPHONE (OUTPATIENT)
Dept: NEUROLOGY | Facility: CLINIC | Age: 54
End: 2024-05-20

## 2024-05-20 ENCOUNTER — READMISSION MANAGEMENT (OUTPATIENT)
Dept: CALL CENTER | Facility: HOSPITAL | Age: 54
End: 2024-05-20

## 2024-05-20 ENCOUNTER — APPOINTMENT (OUTPATIENT)
Dept: ULTRASOUND IMAGING | Facility: HOSPITAL | Age: 54
End: 2024-05-20

## 2024-05-20 VITALS
OXYGEN SATURATION: 96 % | SYSTOLIC BLOOD PRESSURE: 131 MMHG | WEIGHT: 150.79 LBS | HEIGHT: 60 IN | TEMPERATURE: 98.6 F | HEART RATE: 47 BPM | RESPIRATION RATE: 15 BRPM | BODY MASS INDEX: 29.61 KG/M2 | DIASTOLIC BLOOD PRESSURE: 85 MMHG

## 2024-05-20 LAB
FOLATE SERPL-MCNC: 6.69 NG/ML (ref 4.78–24.2)
GLUCOSE BLDC GLUCOMTR-MCNC: 92 MG/DL (ref 70–130)
GLUCOSE BLDC GLUCOMTR-MCNC: 98 MG/DL (ref 70–130)
VIT B12 BLD-MCNC: 397 PG/ML (ref 211–946)

## 2024-05-20 PROCEDURE — 92523 SPEECH SOUND LANG COMPREHEN: CPT

## 2024-05-20 PROCEDURE — 82948 REAGENT STRIP/BLOOD GLUCOSE: CPT

## 2024-05-20 PROCEDURE — 93880 EXTRACRANIAL BILAT STUDY: CPT

## 2024-05-20 PROCEDURE — 99232 SBSQ HOSP IP/OBS MODERATE 35: CPT | Performed by: PSYCHIATRY & NEUROLOGY

## 2024-05-20 PROCEDURE — 97162 PT EVAL MOD COMPLEX 30 MIN: CPT

## 2024-05-20 PROCEDURE — 94761 N-INVAS EAR/PLS OXIMETRY MLT: CPT

## 2024-05-20 PROCEDURE — 93880 EXTRACRANIAL BILAT STUDY: CPT | Performed by: RADIOLOGY

## 2024-05-20 PROCEDURE — 94799 UNLISTED PULMONARY SVC/PX: CPT

## 2024-05-20 PROCEDURE — 97166 OT EVAL MOD COMPLEX 45 MIN: CPT

## 2024-05-20 PROCEDURE — 92610 EVALUATE SWALLOWING FUNCTION: CPT

## 2024-05-20 PROCEDURE — 99239 HOSP IP/OBS DSCHRG MGMT >30: CPT | Performed by: STUDENT IN AN ORGANIZED HEALTH CARE EDUCATION/TRAINING PROGRAM

## 2024-05-20 RX ORDER — AMLODIPINE BESYLATE 5 MG/1
5 TABLET ORAL
Qty: 30 TABLET | Refills: 1 | Status: SHIPPED | OUTPATIENT
Start: 2024-05-21

## 2024-05-20 RX ORDER — ASPIRIN 81 MG/1
81 TABLET ORAL DAILY
Status: DISCONTINUED | OUTPATIENT
Start: 2024-05-21 | End: 2024-05-20 | Stop reason: HOSPADM

## 2024-05-20 RX ORDER — ASPIRIN 81 MG/1
81 TABLET ORAL DAILY
Qty: 30 TABLET | Refills: 1 | Status: SHIPPED | OUTPATIENT
Start: 2024-05-21 | End: 2024-07-20

## 2024-05-20 RX ORDER — LISINOPRIL 5 MG/1
5 TABLET ORAL
Qty: 30 TABLET | Refills: 1 | Status: SHIPPED | OUTPATIENT
Start: 2024-05-21

## 2024-05-20 RX ORDER — ATORVASTATIN CALCIUM 80 MG/1
80 TABLET, FILM COATED ORAL NIGHTLY
Qty: 30 TABLET | Refills: 1 | Status: SHIPPED | OUTPATIENT
Start: 2024-05-20

## 2024-05-20 RX ADMIN — Medication 10 ML: at 08:57

## 2024-05-20 RX ADMIN — AMLODIPINE BESYLATE 5 MG: 5 TABLET ORAL at 08:56

## 2024-05-20 RX ADMIN — ASPIRIN 325 MG: 325 TABLET ORAL at 06:29

## 2024-05-20 RX ADMIN — FAMOTIDINE 20 MG: 20 TABLET, FILM COATED ORAL at 06:30

## 2024-05-20 RX ADMIN — Medication 10 ML: at 08:56

## 2024-05-20 RX ADMIN — LISINOPRIL 5 MG: 2.5 TABLET ORAL at 08:56

## 2024-05-20 NOTE — TELEPHONE ENCOUNTER
Caller: Diane Meyers    Relationship to patient: Self    Best call back number: [phone]     New or established patient?  [x] New  [] Established    Date of discharge: 5/20/24    Facility discharged from:  [facility]    Diagnosis/Symptoms: TIA    Length of stay (If applicable): 1 DAY    Specialty Only: Did you see a [facility] provider?    [x] Yes  [] No  If so, who? CAMILA WOODS W/  TELEPHONED TO REQUEST [facility] F/U  FOR PATIENT FOR DX:TIA    HUB SCHEDULED W/ INCORRECT TIMEFRAME, APPT SHERRY/ NABOR 7/26/24 @ 1:30PM     PLEASE REVIEW & ADVISE-THANK YOU

## 2024-05-20 NOTE — NURSING NOTE
Pt was discharged home without holter monitor that was ordered.  Santy Santana NP was notified.  He stated it was ok for patient to come back to hospital on Thursday, May 23rd when she has follow up appointment with PCP. Notified patient was agreeable to come back May 23rd.

## 2024-05-20 NOTE — PROGRESS NOTES
Patient was able to get up and ambulate independently and the Romberg is negative.  Her blood pressure is better controlled and she has no headaches.  Carotid Doppler studies are currently pending and if it is unremarkable she can be discharged home on baby aspirin and atorvastatin.  She will be followed up by Mr. Santy Santana in the outpatient stroke neurology clinic within 4 to 6 weeks time.  I independently evaluated and examined the patient along with my clinical nurse practitioner LISA Dasilva using the dedicated telemedicine device without any interruption with the patient located at the [facility] and myself at a remote location and I have reviewed this documentation and agree.

## 2024-05-20 NOTE — THERAPY EVALUATION
Acute Care - Physical Therapy Initial Evaluation   Jluis     Patient Name: Diane Meyers  : 1970  MRN: [id number]  Today's Date: 2024   Onset of Illness/Injury or Date of Surgery: 24  Visit Dx:   No diagnosis found.  Patient Active Problem List   Diagnosis    CVA (cerebral vascular accident)     Past Medical History:   Diagnosis Date    Asthma     COPD (chronic obstructive pulmonary disease)     Hypertension      Past Surgical History:   Procedure Laterality Date    APPENDECTOMY       SECTION      x2     PT Assessment (Last 12 Hours)       PT Evaluation and Treatment       Row Name 24 1131          Physical Therapy Time and Intention    Subjective Information no complaints  -CS     Document Type evaluation  -CS     Mode of Treatment physical therapy  -CS     Patient Effort good  -CS     Comment Pt seen bedside for evaluation this AM. Pt was (I) w/ all mobility prior to sudden onset (L)side weakness. Pt feels symptoms have improved but not fully resolved. Pt agreed to evaluation  -CS       Row Name 24 1131          General Information    Patient Profile Reviewed yes  -CS     Onset of Illness/Injury or Date of Surgery 24  -CS     Referring Physician Joseph  -CS     Patient Observations alert;cooperative;agree to therapy  -CS     General Observations of Patient Pt sitting in bedside chair, all CCU lines intact  -CS     Risks Reviewed patient:;LOB;nausea/vomiting;dizziness;increased discomfort;change in vital signs;lines disloged  -CS     Benefits Reviewed patient:;improve function;increase independence;increase strength;increase balance;decrease pain;decrease risk of DVT;improve skin integrity;increase knowledge  -CS       Row Name 24 1131          Pain    Pre/Posttreatment Pain Comment no c/o  -CS       Row Name 24 1131          Cognition    Orientation Status (Cognition) oriented x 4  -CS       Row Name 24 1131          Range of Motion (ROM)    Range  of Motion bilateral lower extremities;ROM is WFL  -CS       Row Name 05/20/24 1131          Strength Comprehensive (MMT)    Comment, General Manual Muscle Testing (MMT) Assessment (R)LE WFL, (L)LE grossly 4-/5  -CS       Row Name 05/20/24 1131          Bed Mobility    Bed Mobility bed mobility (all) activities  -CS     All Activities, [place] (Bed Mobility) standby assist  -CS     Assistive Device (Bed Mobility) bed rails;draw sheet;head of bed elevated  -CS       Row Name 05/20/24 1131          Transfers    Transfers sit-stand transfer;stand-sit transfer  -       Row Name 05/20/24 1131          Sit-Stand Transfer    Sit-Stand [place] (Transfers) standby assist  -       Row Name 05/20/24 1131          Stand-Sit Transfer    Stand-Sit [place] (Transfers) standby assist  -       Row Name 05/20/24 1131          Gait/Stairs (Locomotion)    Gait/Stairs Locomotion gait/ambulation assistive device;gait/ambulation independence;distance ambulated  -CS     [place] Level (Gait) contact guard  -CS     Patient was able to Ambulate yes  -CS     Distance in Feet (Gait) 120  -CS     Pattern (Gait) step-through  -CS     Deviations/Abnormal Patterns (Gait) left sided deviations;steppage;kalyan decreased  -CS     Left Sided Gait Deviations foot drop/toe drag;foot slap  -CS     Comment, (Gait/Stairs) no LOB, but noted labored stepping w/ (L)LE  -CS       Row Name 05/20/24 1131          Balance    Comment, Balance Standing Dynamic: Good(-) - some sway w/ rhomberg w/ eyes closed  -       Row Name 05/20/24 1131          Plan of Care Review    Plan of Care Reviewed With patient  -CS     Progress no change  -CS     Outcome Evaluation Pt presents w/ decreased bed mobility, transfers, and gait. Pt would benefit from skilled PT. Anticipate d/c home w/ outpatient PT.  -       Row Name 05/20/24 1131          Positioning and Restraints    Pre-Treatment Position sitting in chair/recliner  -CS     Post Treatment  Position chair  -CS     In Bed --  -CS     In Chair sitting;call light within reach;encouraged to call for assist  -CS       Row Name 05/20/24 9257          Therapy Assessment/Plan (PT)    Functional Level at Time of Evaluation (PT) SBA/CGA  -CS     PT Diagnosis (PT) impaired functional mobility  -CS     Rehab Potential (PT) good, to achieve stated therapy goals  -CS     Criteria for Skilled Interventions Met (PT) yes;meets criteria;skilled treatment is necessary  -CS     Therapy Frequency (PT) 2 times/wk  2-5x/week  -CS     Predicted Duration of Therapy Intervention (PT) while in house  -CS     Problem List (PT) problems related to;balance;mobility;strength  -CS     Activity Limitations Related to Problem List (PT) unable to ambulate safely;unable to transfer safely  -CS     Comment, Therapy Assessment/Plan (PT) Pt presents w/ decreased bed mobility, transfers, and gait. Pt would benefit from skilled PT. Anticipate d/c home w/ outpatient PT.  -CS       Row Name 05/20/24 1131          PT Evaluation Complexity    History, PT Evaluation Complexity 3 or more personal factors and/or comorbidities  -CS     Examination of Body Systems (PT Eval Complexity) total of 4 or more elements  -CS     Clinical Presentation (PT Evaluation Complexity) evolving  -CS     Clinical Decision Making (PT Evaluation Complexity) moderate complexity  -CS     Overall Complexity (PT Evaluation Complexity) moderate complexity  -CS       Row Name 05/20/24 0622          Therapy Plan Review/Discharge Plan (PT)    Therapy Plan Review (PT) evaluation/treatment results reviewed;care plan/treatment goals reviewed;risks/benefits reviewed;current/potential barriers reviewed;participants voiced agreement with care plan;participants included;patient  -CS       Row Name 05/20/24 8462          Physical Therapy Goals    Transfer Goal Selection (PT) transfer, PT goal 1  -CS     Gait Training Goal Selection (PT) gait training, PT goal 1  -CS       Row Name  05/20/24 1131          Transfer Goal 1 (PT)    Activity/Assistive Device (Transfer Goal 1, PT) transfers, all  -CS     [place] Level/Cues Needed (Transfer Goal 1, PT) standby assist  -CS     Time Frame (Transfer Goal 1, PT) long term goal (LTG);by discharge  -CS       Row Name 05/20/24 1131          Gait Training Goal 1 (PT)    Activity/Assistive Device (Gait Training Goal 1, PT) gait (walking locomotion);assistive device use  -CS     [place] Level (Gait Training Goal 1, PT) standby assist  -CS     Distance (Gait Training Goal 1, PT) 150  -CS     Time Frame (Gait Training Goal 1, PT) long term goal (LTG);by discharge  -CS               User Key  (r) = Recorded By, (t) = Taken By, (c) = Cosigned By      Initials Name Provider Type    Jorge Rahman, PT Physical Therapist                    Physical Therapy Education       Title: PT OT SLP Therapies (Done)       Topic: Physical Therapy (Done)       Point: Mobility training (Done)       Learning Progress Summary             Patient Acceptance, E,TB, VU by  at 5/20/2024 1139                         Point: Home exercise program (Done)       Learning Progress Summary             Patient Acceptance, E,TB, VU by  at 5/20/2024 1139                         Point: Body mechanics (Done)       Learning Progress Summary             Patient Acceptance, E,TB, VU by  at 5/20/2024 1139                         Point: Precautions (Done)       Learning Progress Summary             Patient Acceptance, E,TB, VU by  at 5/20/2024 1139                                         User Key       Initials Effective Dates Name Provider Type Discipline     05/31/23 -  Jorge Denny, PT Physical Therapist PT                  PT Recommendation and Plan  Anticipated Discharge Disposition (PT): home with outpatient therapy services  Planned Therapy Interventions (PT): balance training, bed mobility training, gait training, home exercise program, neuromuscular re-education,  patient/family education, strengthening, transfer training  Therapy Frequency (PT): 2 times/wk (2-5x/week)  Plan of Care Reviewed With: patient  Progress: no change  Outcome Evaluation: Pt presents w/ decreased bed mobility, transfers, and gait. Pt would benefit from skilled PT. Anticipate d/c home w/ outpatient PT.       Time Calculation:    PT Charges       Row Name 05/20/24 1140             Time Calculation    Start Time 1000  -CS      PT Received On 05/20/24  -      PT Goal Re-Cert Due Date 06/03/24  -                User Key  (r) = Recorded By, (t) = Taken By, (c) = Cosigned By      Initials Name Provider Type    CS Jorge Denny, PT Physical Therapist                  Therapy Charges for Today       Code Description Service Date Service Provider Modifiers Qty    [id number]  PT EVAL MOD COMPLEXITY 4 5/20/2024 Jorge Denny, PT GP 1            PT G-Codes  Outcome Measure Options: Modified Cammie  AM-PAC 6 Clicks Score (PT): 24  Modified Hutchinson Scale: 2 - Slight disability.  Unable to carry out all previous activities but able to look after own affairs without assistance.    Jorge Denny PT  5/20/2024

## 2024-05-20 NOTE — CASE MANAGEMENT/SOCIAL WORK
Discharge Planning Assessment   Jluis     Patient Name: Diane Meyers  MRN: [id number]  Today's Date: 5/20/2024    Admit Date: 5/19/2024    Plan: SS received physician consult for stroke. SS spoke with pt at bedside on this date. Pt lives with significant other Naldo at [address] [place] [address]. Pt does not utilize  Services. Pt does not have a PCP at this time. Pt utilizes home oxygen at night at 2 liters and nebulizer via unknown provider. Pt states to being independent with all ADL's prior to hospital admission. Pt has no POA or living will. Pt plans to return home at discharge with significant other Naldo providing transportation. Pt ambulated 120 ft with PT on this date. SS to sign off at this time. Please reconsult if needed.   Discharge Needs Assessment       Row Name 05/20/24 1456       Living Environment    People in Home significant other    Name(s) of People in Home Naldo - significant other    Current Living Arrangements home    Potentially Unsafe Housing Conditions none    In the past 12 months has the electric, gas, oil, or water company threatened to shut off services in your home? No    Primary Care Provided by self    Provides Primary Care For no one    Family Caregiver if Needed significant other    Family Caregiver Names Naldo - s/o    Quality of Family Relationships involved;helpful;supportive    Able to Return to Prior Arrangements yes       Transportation Needs    In the past 12 months, has lack of transportation kept you from medical appointments or from getting medications? no    In the past 12 months, has lack of transportation kept you from meetings, work, or from getting things needed for daily living? No       Food Insecurity    Within the past 12 months, you worried that your food would run out before you got the money to buy more. Never true    Within the past 12 months, the food you bought just didn't last and you didn't have money to get more. Never true        Transition Planning    Patient/Family Anticipates Transition to home    Patient/Family Anticipated Services at Transition none    Transportation Anticipated family or friend will provide       Discharge Needs Assessment    Equipment Currently Used at Home oxygen;nebulizer    Concerns to be Addressed --  stroke                Discharge Plan       Row Name 05/20/24 1457       Plan    Plan SS received physician consult for stroke. SS spoke with pt at bedside on this date. Pt lives with significant other Naldo at [address] [place] [address]. Pt does not utilize  Services. Pt does not have a PCP at this time. Pt utilizes home oxygen at night at 2 liters and nebulizer via unknown provider. Pt states to being independent with all ADL's prior to hospital admission. Pt has no POA or living will. Pt plans to return home at discharge with significant other Naldo providing transportation. Pt ambulated 120 ft with PT on this date. SS to sign off at this time. Please reconsult if needed.               Expected Discharge Date and Time       Expected Discharge Date Expected Discharge Time    May 21, 2024         Demographic Summary       Row Name 05/20/24 1455       General Information    Admission Type inpatient    Arrived From home    Referral Source physician    Reason for Consult --  stroke    Preferred Language English               BECKY Reece

## 2024-05-20 NOTE — PROGRESS NOTES
Stroke Progress Note       Chief Complaint: Left-sided weakness/vision changes    Subjective    Subjective     Subjective:  No acute events overnight.  Patient reports feeling significantly better today, denies any new complaints.  Reports feeling back to her baseline.  Was able to go from bed to standing position without any difficulty.  Romberg was absent.    Review of Systems   Constitutional:  Negative for chills and fever.   Eyes: Negative.    Respiratory: Negative.     Cardiovascular: Negative.    Skin: Negative.    Neurological:  Positive for headaches. Negative for seizures, facial asymmetry, speech difficulty, weakness and numbness.   Psychiatric/Behavioral: Negative.            Objective    Objective      Temp:  [97.6 °F (36.4 °C)-98.3 °F (36.8 °C)] 98.3 °F (36.8 °C)  Heart Rate:  [46-80] 54  Resp:  [12-25] 21  BP: (109-168)/() 123/73    Neurological Exam  Mental Status  Awake and alert. Oriented to person, place and time. Recent and remote memory are intact. Speech is normal. Language is fluent with no aphasia. Attention and concentration are normal. Fund of knowledge is appropriate for level of education.    Cranial Nerves  CN III, IV, VI: Extraocular movements intact bilaterally. Normal lids and orbits bilaterally.  CN VII: Full and symmetric facial movement.  CN IX, X: Palate elevates symmetrically  CN XII: Tongue midline without atrophy or fasciculations.    Motor  Normal muscle bulk throughout. Normal muscle tone. No abnormal involuntary movements. Strength is 5/5 throughout all four extremities.    Coordination  Right: Finger-to-nose normal.Left: Finger-to-nose normal.    Gait   Romberg is absent.      Physical Exam  Vitals and nursing note reviewed.   Constitutional:       General: She is awake. She is not in acute distress.     Appearance: Normal appearance.   HENT:      Head: Normocephalic.      Mouth/Throat:      Mouth: Mucous membranes are moist.      Pharynx: Oropharynx is clear.   Eyes:       General: Lids are normal.      Extraocular Movements: Extraocular movements intact.   Cardiovascular:      Rate and Rhythm: Normal rate and regular rhythm.   Pulmonary:      Effort: Pulmonary effort is normal. No respiratory distress.   Musculoskeletal:         General: Normal range of motion.   Skin:     General: Skin is warm and dry.   Neurological:      Mental Status: She is alert.      Motor: Motor strength is normal.     Coordination: Romberg sign negative.   Psychiatric:         Mood and Affect: Mood normal.         Speech: Speech normal.         Behavior: Behavior normal.       Hospital Meds:  Scheduled- amLODIPine, 5 mg, Oral, Q24H  aspirin, 325 mg, Oral, Daily   Or  aspirin, 300 mg, Rectal, Daily  atorvastatin, 80 mg, Oral, Nightly  famotidine, 20 mg, Oral, BID AC  lisinopril, 5 mg, Oral, Q24H  sodium chloride, 10 mL, Intravenous, Q12H  sodium chloride, 10 mL, Intravenous, Q12H      Infusions-     PRNs-   butalbital-acetaminophen-caffeine    ipratropium    sodium chloride    sodium chloride    sodium chloride    sodium chloride    Results Review:    I reviewed the patient's new clinical results.    Imaging Results (Last 24 Hours)       Procedure Component Value Units Date/Time    MRI Brain Without Contrast [[id number]] Collected: 05/19/24 1705     Updated: 05/19/24 1709    Narrative:      Procedure: Multiplanar multisequence imaging was obtained through the  brain without the use of intravenous contrast.     Comparison: Head CT 5/19/2024, 5/18/2024.     Findings: Appropriate parenchymal volume is noted.  Ventricles are  normal in size and configuration.     No restricted diffusion to suggest an acute or subacute infarction.     No intra-axial or extra-axial hemorrhage.  No edema or midline shift.   No mass is identified.  Mesial temporal lobes are bilaterally symmetric.     Brainstem and cerebellum intact.     Globes are symmetric.  No retrobulbar fat abnormality.  Paranasal  sinuses and mastoid air  cells well-pneumatized.       Impression:         No MRI evidence of an acute intracranial abnormality.        This report was finalized on 5/19/2024 5:07 PM by Maggie Newby MD.       CT Head Without Contrast Stroke Protocol [[id number]] Collected: 05/19/24 1703     Updated: 05/19/24 1707    Narrative:      Procedure: Head CT without intravenous contrast. Sagittal and coronal  reformations are supplied.  No priors     Findings: Appropriate parenchymal volume is noted. Gray-white  differentiation is intact. No edema or midline shift.  No intra-axial or extra-axial hemorrhage. Ventricles normal in size and  configuration.     Brainstem and cerebellum have a normal appearance.  Calvarium intact. Paranasal sinuses and mastoid air cells are  well-pneumatized. Globes are intact. No retrobulbar abnormality.       Impression:      No CT evidence of an acute intracranial abnormality.        This report was finalized on 5/19/2024 5:05 PM by Maggie Newby MD.             Results for orders placed during the hospital encounter of 05/19/24    Adult Transthoracic Echo Complete W/ Cont if Necessary Per Protocol (With Agitated Saline)    Interpretation Summary    Left ventricular ejection fraction appears to be 61 - 65%.    Left ventricular diastolic function was normal.    Saline test results are negative for right to left atrial level shunt.    Estimated right ventricular systolic pressure from tricuspid regurgitation is normal (<35 mmHg).    No significant valvular abnormalities noted.    MRI Brain Without Contrast    Result Date: 5/19/2024   No MRI evidence of an acute intracranial abnormality.   This report was finalized on 5/19/2024 5:07 PM by Maggie Newby MD.      CT Head Without Contrast Stroke Protocol    Result Date: 5/19/2024  No CT evidence of an acute intracranial abnormality.   This report was finalized on 5/19/2024 5:05 PM by Maggie Newby MD.        Lab Results   Component Value Date    HGBA1C 5.60  "05/19/2024      Lab Results   Component Value Date    CHOL 198 05/19/2024    TRIG 142 05/19/2024    HDL 38 (L) 05/19/2024     (H) 05/19/2024      Lab Results   Component Value Date    WBC 6.00 05/19/2024    HGB 13.2 05/19/2024    HCT 40.3 05/19/2024    MCV 88.8 05/19/2024     05/19/2024      Lab Results   Component Value Date    GLUCOSE 178 (H) 05/19/2024    BUN 11 05/19/2024    CREATININE 0.84 05/19/2024    BCR 13.1 05/19/2024    K 3.8 05/19/2024    CO2 24.9 05/19/2024    CALCIUM 9.1 05/19/2024    ALBUMIN 4.1 05/19/2024    AST 12 05/19/2024    ALT 10 05/19/2024      Lab Results   Component Value Date    TSH 2.080 05/19/2024     No results found for: \"HJCJLJHE97\"  No results found for: \"FOLATE\"          Assessment/Plan     Assessment/Plan:  54-year-old female with PMHx significant for COPD, hypertension, tobacco use, remote leukemia in remission who presented with left-sided weakness, headache, and vision changes to an OSH for which she received TNK before being transferred to our facility.  CTA at outside facility without evidence of LVO, did show some atherosclerotic disease but no hemodynamically significant stenosis.  CT head negative for acute changes.  Subsequent MRI brain was negative for any recent ischemia.  Yesterday afternoon patient had acute onset of blurry vision and slurred speech and worsening left-sided weakness while sitting up in a chair, blood pressure up to 170s over 110s.  She had repeat stat CT which was negative for any acute changes.  By the time time she was evaluated by Tele-Neurology symptoms had improved.    #Strokelike symptoms s/p TNK  #Hypertension  #Mixed hyperlipidemia, , goal is less than 70      -Aspirin 81 mg daily  -Atorvastatin 80 mg daily  -Therapy evaluations  -Stroke education to patient/family  -Smoking cessation education  -DVT prophylaxis  -TTE showed LVEF 61 to 65%, negative saline bubble study  -Holter monitor at discharge  -Follow-up in outpatient " stroke clinic in 2 to 3 months    The case was discussed with the patient, and will discuss with Dr. Joseph Santana, APRN  05/20/24  10:43 EDT    This was an audio and video enabled telemedicine encounter.  Dr. Sanchez present for encounter via telemedicine.  Verbal consent taken.

## 2024-05-20 NOTE — NURSING NOTE
"Stroke education performed with pt. Included s/s stroke, BEFAST, calling 911, individualized modifiable risk factors, keeping f/u appts, taking meds as directed, eating a low sodium, low fat diet and exercising 30 min/day 4-5 days/wk. Instructed to stop smoking. Pt voices she will and denies need for cessation aids. States \"I don't have a craving for them. This has opened up my eyes.\" Questions answered. AHA/ASA information forms on the above teaching given to pt.   "

## 2024-05-20 NOTE — THERAPY EVALUATION
"Acute Care - Speech Language Pathology   Swallow Initial Evaluation [facility]  CLINICAL DYSPHAGIA ASSESSMENT     Patient Name: Diane Meyers  : 1970  MRN: [id number]  Today's Date: 2024             Admit Date: 2024    Diane Meyers  was seen at bedside this am on CCU to assess safety/efficacy of swallowing fnx, determine safest/least restrictive diet tolerance.     Ms. Meyers presented to [facility] ED in transfer from [facility] for evaluation of left-side weakness, visual changes, expressive aphasia. She reported that she was at her place of work on 24, around 5pm, when she noticed acute onset of left upper extremity weakness, bilateral vision loss, expressive aphasia with inability to communicate what was happening to her boss. These symptoms lasted 7-10 minutes. She reported to outside hospital, presenting with NIH sacale of 5. Vital signs upon arrival to the outside facility revealed a blood pressure of 194/118 mmHg, heart rate 90, respirations 20 and temperature 98.1 °F. She received TNK for acute stroke. She was transferred for further evaluation and management.     PMH significant for COPD, hypertension.    She was referred per stroke protocol.     Social History     Socioeconomic History    Marital status: Single   Tobacco Use    Smoking status: Every Day     Current packs/day: 1.00     Average packs/day: 1 pack/day for 39.4 years (39.4 ttl pk-yrs)     Types: Cigarettes     Start date:      Passive exposure: Current    Smokeless tobacco: Never   Vaping Use    Vaping status: Never Used   Substance and Sexual Activity    Alcohol use: Never    Drug use: Yes     Frequency: 2.0 times per week     Types: Marijuana     Comment: \"gummies for anxiety\"    Sexual activity: Defer      Imaging:  Narrative & Impression   Procedure: Multiplanar multisequence imaging was obtained through the  brain without the use of intravenous contrast.     Comparison: Head CT 2024, 2024.     Findings: " Appropriate parenchymal volume is noted.  Ventricles are  normal in size and configuration.     No restricted diffusion to suggest an acute or subacute infarction.     No intra-axial or extra-axial hemorrhage.  No edema or midline shift.   No mass is identified.  Mesial temporal lobes are bilaterally symmetric.     Brainstem and cerebellum intact.     Globes are symmetric.  No retrobulbar fat abnormality.  Paranasal  sinuses and mastoid air cells well-pneumatized.     IMPRESSION:     No MRI evidence of an acute intracranial abnormality.        This report was finalized on 5/19/2024 5:07 PM by Maggie Newby MD.        Labs:       05/20/24 0638  POC Glucose Once  Collected: 05/20/24 0631  Final result  Specimen: Blood    Glucose 92 mg/dL            05/20/24 0015  POC Glucose Once  Collected: 05/20/24 0009  Final result  Specimen: Blood    Glucose 98 mg/dL            05/19/24 1958  TSH  Collected: 05/19/24 1901  Final result  Specimen: Blood    TSH 2.080 uIU/mL            05/19/24 1957  Hemoglobin A1c  Collected: 05/19/24 1901  Final result  Specimen: Blood    Hemoglobin A1C 5.60 %            05/19/24 1952  Comprehensive Metabolic Panel  Collected: 05/19/24 1901  Final result  Specimen: Blood    Glucose 178 High  mg/dL ALT (SGPT) 10 U/L   BUN 11 mg/dL AST (SGOT) 12 U/L   Creatinine 0.84 mg/dL Alkaline Phosphatase 95 U/L   Sodium 142 mmol/L Total Bilirubin 0.2 mg/dL   Potassium 3.8 mmol/L Globulin 2.5 gm/dL   Chloride 108 High  mmol/L A/G Ratio 1.6 g/dL   CO2 24.9 mmol/L BUN/Creatinine Ratio 13.1   Calcium 9.1 mg/dL Anion Gap 9.1 mmol/L   Total Protein 6.6 g/dL eGFR 81.7 mL/min/1.73   Albumin 4.1 g/dL            05/19/24 1952  Lipid Panel  Collected: 05/19/24 1901  Final result  Specimen: Blood    Total Cholesterol 198 mg/dL LDL Cholesterol 134 High  mg/dL   Triglycerides 142 mg/dL VLDL Cholesterol 26 mg/dL   HDL Cholesterol 38 Low  mg/dL LDL/HDL Ratio 3.46          05/19/24 1952  C-reactive  Protein  Collected: 05/19/24 1901  Final result  Specimen: Blood    C-Reactive Protein 0.35 mg/dL            05/19/24 1940  CBC & Differential  Collected: 05/19/24 1901  Final result  Specimen: Blood           05/19/24 1940  CBC Auto Differential  Collected: 05/19/24 1901  Final result  Specimen: Blood    WBC 6.00 10*3/mm3 Lymphocyte % 33.5 %   RBC 4.54 10*6/mm3 Monocyte % 3.7 Low  %   Hemoglobin 13.2 g/dL Eosinophil % 2.5 %   Hematocrit 40.3 % Basophil % 0.7 %   MCV 88.8 fL Immature Grans % 0.7 High  %   MCH 29.1 pg Neutrophils, Absolute 3.54 10*3/mm3   MCHC 32.8 g/dL Lymphocytes, Absolute 2.01 10*3/mm3   RDW 12.6 % Monocytes, Absolute 0.22 10*3/mm3   RDW-SD 41.4 fl Eosinophils, Absolute 0.15 10*3/mm3   MPV 11.1 fL Basophils, Absolute 0.04 10*3/mm3   Platelets 179 10*3/mm3 Immature Grans, Absolute 0.04 10*3/mm3   Neutrophil % 58.9 % nRBC 0.0 /100 WBC            Diet Orders (active) (From admission, onward)       Start     Ordered    05/19/24 0350  Diet: Cardiac; Healthy Heart (2-3 Na+); Fluid Consistency: Thin (IDDSI 0)  Diet Effective Now         05/19/24 0349                  She was observed on ra w/o complications.     Ms. Meyers was positioned upright and centered in bed to accept multiple po presentations of ice chips, solid cracker, puree, and thin liquids via spoon, cup, and straw.  She was able to self provide po trials.     Facial/oral structures were symmetrical upon observation. Lingual protrusion revealed no deviation. Oral mucosa were moist, pink, and clean. Secretions were clear, thin, and well controlled. OROM/HALEIGH was wfl to imitate oral postures. Gag is not assessed. Volitional cough was intact w/ adequate  intensity, clear in quality, non-productive. Voice was adequate in intensity, clear in quality w/ intelligible speech.    Upon po presentations, adequate bolus anticipation and acceptance w/ good labial seal for bolus clearance via spoon bowl, cup rim stability and suction via straw. Bolus  formation, manipulation and control were wfl w/ rotary mastication pattern. A-p transit was timely w/o significant oral residue appreciated. No overt s/s aspiration before the swallow.      Pharyngeal swallow was timely w/ adequate hyolaryngeal elevation per palpation. No overt s/s aspiration evidenced across this evaluation. No silent aspiration suspected. She denied odynophagia.    Visit Dx:   No diagnosis found.  Patient Active Problem List   Diagnosis    CVA (cerebral vascular accident)     Past Medical History:   Diagnosis Date    Asthma     COPD (chronic obstructive pulmonary disease)     Hypertension      Past Surgical History:   Procedure Laterality Date    APPENDECTOMY       SECTION      x2     Impression: Ms. Meyers presented w/ wfl oropharyngeal swallow w/o s/s aspiration.No s/s indicative of silent aspiration.  No odynophagia reported.      EDUCATION  The patient has been educated in the following areas:   Dysphagia (Swallowing Impairment) Oral Care/Hydration.     SLP Recommendation and Plan   1. Continue regular consistency po diet, thin liquids.    2. Medications whole in puree/thins.   3. Upright and centered for all po intake  4. DESTINEY precautions.  5. Oral care protocol.  No further skilled SLP f/u warranted/recommended at this time.    D/w patient results and recommendations w/ verbal agreement.    D/w RN results and recommendations w/ verbal agreement.    Thank you for allowing me to participate in the care of your patient-   Iqra Santana M.S., CCC/SLP                                                                               Time Calculation:       Therapy Charges for Today       Code Description Service Date Service Provider Modifiers Qty    02555958207 HC ST EVAL ORAL PHARYNG SWALLOW 4 2024 Iqra Santana, MS CCC-SLP GN 1    16516418661 HC ST EVAL SPEECH AND PROD W LANG  4 2024 Iqra Santana, MS CCC-SLP GN 1                 Iqra Santana, MS  CCC-SLP  2024   and     Acute Care - Speech Language Pathology Initial Evaluation  [facility]  SPEECH/LANGUAGE/COGNITIVE ASSESSMENT     Patient Name: Diane Meyers  : 1970  MRN: [id number]  Today's Date: 2024             Admit Date: 2024     Diane Meyers  was seen this am on CCU to participate in s/l and cognitive assessment for safety/function in adls. She was positioned upright and centered in a bedside chair to cooperatively participate. All results and observations of this evaluation are felt to be representative of her current functional status.    Ms. Meyers presented to [facility] ED in transfer from [facility] for evaluation of left-side weakness, visual changes, expressive aphasia. She reported that she was at her place of work on 24, around 5pm, when she noticed acute onset of left upper extremity weakness, bilateral vision loss, expressive aphasia with inability to communicate what was happening to her boss. These symptoms lasted 7-10 minutes. She reported to outside hospital, presenting with NIH sacale of 5. Vital signs upon arrival to the outside facility revealed a blood pressure of 194/118 mmHg, heart rate 90, respirations 20 and temperature 98.1 °F. She received TNK for acute stroke. She was transferred for further evaluation and management.     PMH significant for COPD, hypertension.    She was referred per stroke protocol. She stated that her expressive aphasia, vision changes have resolved, persistent mild left weakness only. She denied any orofacial weakness.     Social History     Socioeconomic History    Marital status: Single   Tobacco Use    Smoking status: Every Day     Current packs/day: 1.00     Average packs/day: 1 pack/day for 39.4 years (39.4 ttl pk-yrs)     Types: Cigarettes     Start date:      Passive exposure: Current    Smokeless tobacco: Never   Vaping Use    Vaping status: Never Used   Substance and Sexual Activity    Alcohol use: Never    Drug use:  "Yes     Frequency: 2.0 times per week     Types: Marijuana     Comment: \"gummies for anxiety\"    Sexual activity: Defer      Diet Orders (active) (From admission, onward)       Start     Ordered    05/19/24 0350  Diet: Cardiac; Healthy Heart (2-3 Na+); Fluid Consistency: Thin (IDDSI 0)  Diet Effective Now         05/19/24 0349                  She was oberved on ra w/o complications.     Receptive language skills were intact. She was able to id common objects and personal body parts, follow simple and multi-step directives, understand complex \"wh\" questions and participate in conversational exchange w/o difficulties.    Expressive language skills were intact. She was able to complete automatic speech tasks, confrontation naming tasks, complete complex oe sentences, respond to complet oe \"wh\" questions, repeat at word/sentence and multiple digit levels, as well as participate in complex conversational exchange. No aphasia, anomia or verbal apraxia evidenced.    Ms. Meyers was independently oriented to person, place, and time. Immediate, STM, and LTM were WFL w/ patient recalling recent adls and providing personal information w/o delays. Thought organization, processing, and problem solving were WFL w/patient demonstrating appropriate comparing/contrasting, understanding of idiomatic language, convergent and divergent thinking skills.    Facial/oral structures were symmetrical upon observation. Oral mucosa were moist, pink and clean. Secretions were clear, thin, and controlled. OROM/HALEIGH was WFL w/o lingual deviation upon protrusion. Speech intensity, clarity, and intelligibility were WFL w/o dysarthria or oral apraxia noted.    Graphic and reading skills were intact, premorbid baseline status.     Pragmatic skills were WFL w/ appropriate eye gaze patterns and visual tracking. Language was appropriate in context w/ topic initiation and maintenance independently. No neglect evidenced. Humor response was intact w/ " appropriate affect.    Visit Dx:  No diagnosis found.  Patient Active Problem List   Diagnosis    CVA (cerebral vascular accident)     Past Medical History:   Diagnosis Date    Asthma     COPD (chronic obstructive pulmonary disease)     Hypertension      Past Surgical History:   Procedure Laterality Date    APPENDECTOMY       SECTION      x2     EDUCATION  The patient has been educated in the following areas:   Cognitive Impairment Communication Impairment.    Impression:  Ms. Meyers presents with speech, language, and cognitive skills representative of their premorbid baseline function.    SLP Recommendation and Plan   No further skilled SLP f/u recommended for s/l and cognitive skills. She did participate in clinical dysphagia assessment. Please see full dysphagia note for details.    D/w patient results and recommendations w/ verbal understanding and agreement.    Thank you for allowing me to participate in the care of your patient-   Iqra Santana M.S., CCC/SLP                                                                                             Time Calculation:         Therapy Charges for Today       Code Description Service Date Service Provider Modifiers Qty    11018017643 HC ST EVAL ORAL PHARYNG SWALLOW 4 2024 Iqra Santana, MS CCC-SLP GN 1    79708896850 HC ST EVAL SPEECH AND PROD W LANG  4 2024 Iqra Santana, MS CCC-SLP GN 1                       Iqra Santana MS CCC-SLP  2024

## 2024-05-20 NOTE — THERAPY EVALUATION
Patient Name: Diane Meyers  : 1970    MRN: [id number]                              Today's Date: 2024       Admit Date: 2024    Visit Dx: No diagnosis found.  Patient Active Problem List   Diagnosis    CVA (cerebral vascular accident)     Past Medical History:   Diagnosis Date    Asthma     COPD (chronic obstructive pulmonary disease)     Hypertension      Past Surgical History:   Procedure Laterality Date    APPENDECTOMY       SECTION      x2      General Information       Row Name 24 1109          OT Time and Intention    Document Type evaluation  -LM     Mode of Treatment occupational therapy  -LM       Row Name 24 1109          General Information    Patient Profile Reviewed yes  -LM     Prior Level of Function independent:;all household mobility;ADL's;work;transfer  -LM     Existing Precautions/Restrictions fall  -LM     Barriers to Rehab none identified  -LM       Row Name 24 1109          Living Environment    People in Home significant other  -LM       Row Name 24 110          Cognition    Orientation Status (Cognition) oriented x 4  -LM       Row Name 24 110          Safety Issues, Functional Mobility    Impairments Affecting Function (Mobility) balance;coordination;strength;endurance/activity tolerance  -LM               User Key  (r) = Recorded By, (t) = Taken By, (c) = Cosigned By      Initials Name Provider Type    LM Adriana Perdomo OT Occupational Therapist                     Mobility/ADL's       Row Name 24 1110          Transfers    Transfers toilet transfer  -LM       Row Name 24 1110          Toilet Transfer    Type (Toilet Transfer) stand pivot/stand step  -LM     [place] Level (Toilet Transfer) standby assist;contact guard  -       Row Name 24 111          Activities of Daily Living    BADL Assessment/Intervention bathing;upper body dressing;lower body dressing;grooming;feeding;toileting  -LM       Row Name  05/20/24 1110          Bathing Assessment/Intervention    [place] Level (Bathing) set up;standby assist  -LM       Row Name 05/20/24 1110          Upper Body Dressing Assessment/Training    [place] Level (Upper Body Dressing) set up  -LM       Row Name 05/20/24 1110          Lower Body Dressing Assessment/Training    [place] Level (Lower Body Dressing) set up;standby assist  -LM       Row Name 05/20/24 1110          Grooming Assessment/Training    [place] Level (Grooming) set up  -LM       [facility] Name 05/20/24 1110          Self-Feeding Assessment/Training    [place] Level (Feeding) modified independence  -LM       [facility] Name 05/20/24 1110          Toileting Assessment/Training    [place] Level (Toileting) standby assist;set up  -LM               User Key  (r) = Recorded By, (t) = Taken By, (c) = Cosigned By      Initials Name Provider Type    LM Adriana Perdomo, OT Occupational Therapist                   Obj/Interventions       [facility] Name 05/20/24 1111          Sensory Assessment (Somatosensory)    Sensory Assessment (Somatosensory) sensation intact  -LM       [facility] Name 05/20/24 1111          Vision Assessment/Intervention    Visual Impairment/Limitations WFL  -LM       [facility] Name 05/20/24 1111          Range of Motion Comprehensive    General Range of Motion no range of motion deficits identified  -LM       [facility] Name 05/20/24 1111          Strength Comprehensive (MMT)    General Manual Muscle Testing (MMT) Assessment upper extremity strength deficits identified  -LM     Comment, General Manual Muscle Testing (MMT) Assessment LUE 3/5 versus RUE 5/5  -LM       [facility] Name 05/20/24 1111          Motor Skills    Motor Skills functional endurance;coordination  -LM     Coordination fine motor deficit;gross motor deficit;left;upper extremity;minimal impairment  -LM     Functional Endurance F+  -LM               User Key  (r) = Recorded By, (t) = Taken By, (c) = Cosigned By      Initials Name Provider Type      Adriana Perdomo, OT Occupational Therapist                   Goals/Plan       Row Name 05/20/24 1114          Transfer Goal 1 (OT)    Activity/Assistive Device (Transfer Goal 1, OT) transfers, all;toilet  -     [place] Level/Cues Needed (Transfer Goal 1, OT) modified independence  -LM     Time Frame (Transfer Goal 1, OT) by discharge  -       Row Name 05/20/24 1114          Dressing Goal 1 (OT)    Activity/Device (Dressing Goal 1, OT) dressing skills, all  -LM     [place]/Cues Needed (Dressing Goal 1, OT) modified independence  -LM     Time Frame (Dressing Goal 1, OT) by discharge  -       Row Name 05/20/24 1114          Therapy Assessment/Plan (OT)    Planned Therapy Interventions (OT) activity tolerance training;adaptive equipment training;BADL retraining;transfer/mobility retraining;strengthening exercise;ROM/therapeutic exercise;patient/caregiver education/training;neuromuscular control/coordination retraining  -               User Key  (r) = Recorded By, (t) = Taken By, (c) = Cosigned By      Initials Name Provider Type     Adriana Perdomo, OT Occupational Therapist                   Clinical Impression       Row Name 05/20/24 1112          Plan of Care Review    Plan of Care Reviewed With patient  -       Row Name 05/20/24 1112          Therapy Assessment/Plan (OT)    Patient/Family Therapy Goal Statement (OT) return home to [facility]  -     Rehab Potential (OT) good, to achieve stated therapy goals  -     Criteria for Skilled Therapeutic Interventions Met (OT) yes;meets criteria;skilled treatment is necessary  sensation, visual changes appear to have resolved.  LUE weakness, incoordination, and decreased balance noted  -     Therapy Frequency (OT) other (see comments)  prn and/or to monitor fxl progress  -       Row Name 05/20/24 1112          Therapy Plan Review/Discharge Plan (OT)    Anticipated Discharge Disposition (OT) home with outpatient therapy services  -       Row Name  05/20/24 1112          Positioning and Restraints    Post Treatment Position chair  -LM     In Chair call light within reach;encouraged to call for assist;patient within staff view  -LM               User Key  (r) = Recorded By, (t) = Taken By, (c) = Cosigned By      Initials Name Provider Type    Adriana Melgoza OT Occupational Therapist                   Outcome Measures       Row Name 05/20/24 0800          How much help from another person do you currently need...    Turning from your back to your side while in flat bed without using bedrails? 4  -SW     Moving from lying on back to sitting on the side of a flat bed without bedrails? 4  -SW     Moving to and from a bed to a chair (including a wheelchair)? 4  -SW     Standing up from a chair using your arms (e.g., wheelchair, bedside chair)? 4  -SW     Climbing 3-5 steps with a railing? 4  -SW     To walk in hospital room? 4  -SW     AM-PAC 6 Clicks Score (PT) 24  -SW     Highest Level of Mobility Goal 8 --> Walked 250 feet or more  -       Row Name 05/20/24 1115          Modified [place] Scale    Pre-Stroke Modified [place] Scale 0 - No Symptoms at all.  -LM     Modified [place] Scale 2 - Slight disability.  Unable to carry out all previous activities but able to look after own affairs without assistance.  -       Row Name 05/20/24 1115          Functional Assessment    Outcome Measure Options Modified [place]  -LM               User Key  (r) = Recorded By, (t) = Taken By, (c) = Cosigned By      Initials Name Provider Type    Adriana Melgoza, OT Occupational Therapist    Reny Alvarado RN Registered Nurse                      OT Recommendation and Plan  Planned Therapy Interventions (OT): activity tolerance training, adaptive equipment training, BADL retraining, transfer/mobility retraining, strengthening exercise, ROM/therapeutic exercise, patient/caregiver education/training, neuromuscular control/coordination retraining  Therapy Frequency (OT):  other (see comments) (prn and/or to monitor fxl progress)  Plan of Care Review  Plan of Care Reviewed With: patient     Time Calculation:          Therapy Charges for Today       Code Description Service Date Service Provider Modifiers Qty    09808849604 HC OT EVAL MOD COMPLEXITY 4 5/20/2024 Adriana Perdomo, OT GO 1                 Adriana Perdomo OT  5/20/2024

## 2024-05-20 NOTE — CONSULTS
Diabetes Education    Patient Name:  Diane Meyers  YOB: 1970  MRN: [id number]  Admit Date:  5/19/2024        A1C 5.5 Per stroke protocol, patient has no history of diabetes, BG WNL. If any questions or concerns please re-consult or call. Thank you      Electronically signed by:  Nisha Tucker RN  05/20/24 07:59 EDT

## 2024-05-20 NOTE — PLAN OF CARE
Goal Outcome Evaluation:  Plan of Care Reviewed With: patient        Progress: no change  Outcome Evaluation: Pt presents w/ decreased bed mobility, transfers, and gait. Pt would benefit from skilled PT. Anticipate d/c home w/ outpatient PT.      Anticipated Discharge Disposition (PT): home with outpatient therapy services

## 2024-05-20 NOTE — CASE MANAGEMENT/SOCIAL WORK
Discharge Planning Assessment   Jluis     Patient Name: Diane Meyers  MRN: [id number]  Today's Date: 5/20/2024    Admit Date: 5/19/2024     Discharge Plan       Row Name 05/20/24 1547       Plan    Final Discharge Disposition Code 01 - home or self-care    Final Note Pt is being discharge home on this date. No other needs identified.        KATERIN ReeceW

## 2024-05-20 NOTE — PLAN OF CARE
Problem: Adult Inpatient Plan of Care  Goal: Plan of Care Review  Outcome: Ongoing, Progressing  Goal: Patient-Specific Goal (Individualized)  Outcome: Ongoing, Progressing  Goal: Absence of Hospital-Acquired Illness or Injury  Outcome: Ongoing, Progressing  Intervention: Identify and Manage Fall Risk  Recent Flowsheet Documentation  Taken 5/20/2024 0400 by Pam Conrad RN  Safety Promotion/Fall Prevention: safety round/check completed  Taken 5/20/2024 0300 by Pam Conrad RN  Safety Promotion/Fall Prevention: safety round/check completed  Taken 5/20/2024 0200 by Pam Conrad RN  Safety Promotion/Fall Prevention: safety round/check completed  Taken 5/20/2024 0100 by Pam Conrad RN  Safety Promotion/Fall Prevention: safety round/check completed  Taken 5/20/2024 0000 by Rosenbalm, Pam, RN  Safety Promotion/Fall Prevention: safety round/check completed  Taken 5/19/2024 2300 by Pam Conrad RN  Safety Promotion/Fall Prevention: safety round/check completed  Taken 5/19/2024 2200 by Pam Conrad RN  Safety Promotion/Fall Prevention: safety round/check completed  Taken 5/19/2024 2100 by Pam Conrad RN  Safety Promotion/Fall Prevention: safety round/check completed  Taken 5/19/2024 2000 by Rosenbalm, Pam, RN  Safety Promotion/Fall Prevention: safety round/check completed  Taken 5/19/2024 1900 by Rosenbalm, Pam, RN  Safety Promotion/Fall Prevention: safety round/check completed  Intervention: Prevent Skin Injury  Recent Flowsheet Documentation  Taken 5/20/2024 0400 by Pam Conrad RN  Body Position: position changed independently  Taken 5/20/2024 0200 by Pam Conrad RN  Body Position: position changed independently  Taken 5/20/2024 0000 by Pam Conrad RN  Body Position: position changed independently  Taken 5/19/2024 2200 by Pam Conrad RN  Body Position: position changed independently  Taken 5/19/2024 2000 by  Pam Conrad RN  Body Position: position changed independently  Intervention: Prevent and Manage VTE (Venous Thromboembolism) Risk  Recent Flowsheet Documentation  Taken 5/20/2024 0200 by Pam Conrad RN  Activity Management: bedrest  Taken 5/19/2024 2000 by Pam Conrad RN  VTE Prevention/Management:   bilateral   sequential compression devices on  Goal: Optimal Comfort and Wellbeing  Outcome: Ongoing, Progressing  Intervention: Provide Person-Centered Care  Recent Flowsheet Documentation  Taken 5/20/2024 0200 by Pam Conrad RN  Trust Relationship/Rapport: care explained  Taken 5/19/2024 2000 by Pam Conrad RN  Trust Relationship/Rapport:   care explained   choices provided  Goal: Readiness for Transition of Care  Outcome: Ongoing, Progressing     Problem: COPD (Chronic Obstructive Pulmonary Disease) Comorbidity  Goal: Maintenance of COPD Symptom Control  Outcome: Ongoing, Progressing  Intervention: Maintain COPD-Symptom Control  Recent Flowsheet Documentation  Taken 5/20/2024 0400 by Pam Conrad RN  Medication Review/Management: medications reviewed  Taken 5/20/2024 0200 by Pam Conrad RN  Medication Review/Management: medications reviewed  Taken 5/20/2024 0000 by Pam Conrad RN  Medication Review/Management: medications reviewed  Taken 5/19/2024 2200 by Pam Conrad RN  Medication Review/Management: medications reviewed  Taken 5/19/2024 2000 by Pam Conrad RN  Medication Review/Management: medications reviewed     Problem: Fall Injury Risk  Goal: Absence of Fall and Fall-Related Injury  Outcome: Ongoing, Progressing  Intervention: Identify and Manage Contributors  Recent Flowsheet Documentation  Taken 5/20/2024 0400 by Pam Conrad RN  Medication Review/Management: medications reviewed  Taken 5/20/2024 0200 by Pam Conrad RN  Medication Review/Management: medications reviewed  Taken 5/20/2024 0000 by [place]  ARIEL Orozco  Medication Review/Management: medications reviewed  Taken 5/19/2024 2200 by Pam Conrad RN  Medication Review/Management: medications reviewed  Taken 5/19/2024 2000 by Pam Conrad RN  Medication Review/Management: medications reviewed  Intervention: Promote Injury-Free Environment  Recent Flowsheet Documentation  Taken 5/20/2024 0400 by Pam Conrad RN  Safety Promotion/Fall Prevention: safety round/check completed  Taken 5/20/2024 0300 by Pam Conrad RN  Safety Promotion/Fall Prevention: safety round/check completed  Taken 5/20/2024 0200 by Pam Conrad RN  Safety Promotion/Fall Prevention: safety round/check completed  Taken 5/20/2024 0100 by Pam Conrad RN  Safety Promotion/Fall Prevention: safety round/check completed  Taken 5/20/2024 0000 by Rosenbalm, Pam, RN  Safety Promotion/Fall Prevention: safety round/check completed  Taken 5/19/2024 2300 by Pam Conrad RN  Safety Promotion/Fall Prevention: safety round/check completed  Taken 5/19/2024 2200 by Pam Conrad RN  Safety Promotion/Fall Prevention: safety round/check completed  Taken 5/19/2024 2100 by Pam Conrad RN  Safety Promotion/Fall Prevention: safety round/check completed  Taken 5/19/2024 2000 by Rosenbalm, Pam, RN  Safety Promotion/Fall Prevention: safety round/check completed  Taken 5/19/2024 1900 by Rosenbalm, Pam, RN  Safety Promotion/Fall Prevention: safety round/check completed   Goal Outcome Evaluation:

## 2024-05-21 ENCOUNTER — TRANSITIONAL CARE MANAGEMENT TELEPHONE ENCOUNTER (OUTPATIENT)
Dept: CALL CENTER | Facility: HOSPITAL | Age: 54
End: 2024-05-21

## 2024-05-21 NOTE — OUTREACH NOTE
Call Center TCM Note      Flowsheet Row Responses   [facility] [place] facility patient discharged from? [facility]   Does the patient have one of the following disease processes/diagnoses(primary or secondary)? Stroke   TCM attempt successful? No  [No VR]   Unsuccessful attempts Attempt 1   Call Status Voice mail issues  [VM not set up]            Marie Flores RN    5/21/2024, 15:18 EDT

## 2024-05-21 NOTE — OUTREACH NOTE
Call Center TCM Note      Flowsheet Row Responses   [facility] patient discharged from? [facility]   Does the patient have one of the following disease processes/diagnoses(primary or secondary)? Stroke   TCM attempt successful? Yes   Call start time 1653   Call end time 1716   Discharge diagnosis CVA (cerebral vascular accident)   Meds reviewed with patient/caregiver? Yes   Is the patient having any side effects they believe may be caused by any medication additions or changes? No   Does the patient have all medications ordered at discharge? Yes   Is the patient taking all medications as directed (includes completed medication regime)? Yes   Comments Pt is eligible for TCM appt within 14 days of DC. New pt appt 5-23-24   Does the patient have an appointment with their PCP within 7-14 days of discharge? Other   Does the patient require any assistance with activities of daily living such as eating, bathing, dressing, walking, etc.? Yes   ADL comments Pt is unable to tie her shoes and needs a cane for balance.   Does the patient have any residual symptoms from stroke/TIA? Yes   Residual symptoms comments Left side weakness remains,pt is unable to hold a drink in her left hand but is able to brush her teeth and get dressed. Pt is Left hand dominant. Pt has some L.side facial droop that is more noticeable when fatigued. Pt reports that her balance off slightly unsteady plans to use cane. Pt reports some memory issues but no speech impairment or issues tolerating po intake. Pt denies vision issues.   Does the patient understand the diet ordered at discharge? Yes   Comments RN educated pt on importance of monitoring BP at home r/t new medications. Pt reports that she has stopped smoking, encouragement given to pt by RN.   Did the patient receive a copy of their discharge instructions? Yes   Nursing interventions Reviewed instructions with patient   What is the patient's perception of their health status since discharge?  Improving   Nursing interventions Nurse provided patient education   Is the patient/caregiver able to teach back the risk factors for a stroke? Smoking, High blood pressure-goal below 120/80   Is the patient/caregiver able to teach back signs and symptoms related to disease process for when to call PCP? Yes   Is the patient/caregiver able to teach back signs and symptoms related to disease process for when to call 911? Yes   If the patient is a current smoker, are they able to teach back resources for cessation? Not a smoker   Is the patient/caregiver able to teach back the hierarchy of who to call/visit for symptoms/problems? PCP, Specialist, Home health nurse, Urgent Care, ED, 911 Yes   Is the patient able to teach back FAST for Stroke? B alance: Watch for sudden loss of balance, E yes: Check for vision loss, F ace: Look for an uneven smile, A rm: Check if one arm is weak, S peech: Listen for slurred speech, T alonso: Call 9-1-1 right away   TCM call completed? Yes   Call end time [id number]            Marie Flores RN    5/21/2024, 17:17 EDT

## 2024-05-21 NOTE — OUTREACH NOTE
Prep Survey      Flowsheet Row Responses   [facility] patient discharged from? [facility]   Is LACE score < 7 ? Yes   Eligibility [facility]   Date of Admission 05/19/24   Date of Discharge 05/20/24   Discharge Disposition Home or Self Care   Discharge diagnosis CVA (cerebral vascular accident)   Does the patient have one of the following disease processes/diagnoses(primary or secondary)? Stroke   Does the patient have Home health ordered? No   Is there a DME ordered? No   Comments regarding appointments new PCP appt   Prep survey completed? Yes            Tran ROBERTS - Registered Nurse

## 2024-05-23 ENCOUNTER — OFFICE VISIT (OUTPATIENT)
Dept: FAMILY MEDICINE CLINIC | Facility: CLINIC | Age: 54
End: 2024-05-23

## 2024-05-23 ENCOUNTER — TELEPHONE (OUTPATIENT)
Dept: FAMILY MEDICINE CLINIC | Facility: CLINIC | Age: 54
End: 2024-05-23

## 2024-05-23 VITALS
SYSTOLIC BLOOD PRESSURE: 108 MMHG | HEART RATE: 65 BPM | BODY MASS INDEX: 36.4 KG/M2 | WEIGHT: 185.4 LBS | OXYGEN SATURATION: 97 % | TEMPERATURE: 97.3 F | DIASTOLIC BLOOD PRESSURE: 74 MMHG | HEIGHT: 60 IN

## 2024-05-23 DIAGNOSIS — Z12.31 ENCOUNTER FOR SCREENING MAMMOGRAM FOR MALIGNANT NEOPLASM OF BREAST: ICD-10-CM

## 2024-05-23 DIAGNOSIS — I63.9 CEREBROVASCULAR ACCIDENT (CVA), UNSPECIFIED MECHANISM: Primary | ICD-10-CM

## 2024-05-23 DIAGNOSIS — Z12.11 SCREEN FOR COLON CANCER: ICD-10-CM

## 2024-05-23 DIAGNOSIS — F41.9 ANXIETY: ICD-10-CM

## 2024-05-23 PROCEDURE — 99495 TRANSJ CARE MGMT MOD F2F 14D: CPT | Performed by: FAMILY MEDICINE

## 2024-05-23 RX ORDER — ESCITALOPRAM OXALATE 10 MG/1
10 TABLET ORAL DAILY
Qty: 30 TABLET | Refills: 2 | Status: SHIPPED | OUTPATIENT
Start: 2024-05-23

## 2024-05-23 NOTE — PROGRESS NOTES
Transitional Care Follow Up Visit  Subjective     Diane Meyers is a 54 y.o. female who presents for a transitional care management visit.    Within 48 business hours after discharge our office contacted her via telephone to coordinate her care and needs.      I reviewed and discussed the details of that call along with the discharge summary, hospital problems, inpatient lab results, inpatient diagnostic studies, and consultation reports with Diane.     Current outpatient and discharge medications have been reconciled for the patient.  Reviewed by: LISA Zabala          5/20/2024     7:58 PM   Date of TCM Phone Call   [facility]   Date of Admission 5/19/2024   Date of Discharge 5/20/2024   Discharge Disposition Home or Self Care     Risk for Readmission (LACE) Score: 5 (5/20/2024  6:00 AM)      History of Present Illness   Course During Hospital Stay: Hospital records reviewed discharge summary copied below  In brief, Diane Meyers is a 54 y.o. female with above noted PMH that presented initially with left-sided weakness with aphasia and concern for acute CVA status post TNK given at OSH. Imaging w/u revealed no ischemia or perfusion deficits with sx resolution by time of transfer however did have recurrent episode of left sided numbness shortly after admission, repeat CT imaging again remained negative and sx resolved spontaneously within a few minutes w/o intervention. She was seen by pt/ot/SLP as part of stroke bundle and is being discharge home to f/u closely as outpatient with neurology.     Suspect patient either had TIA or aborted CVA via TNK at OSH thus neurology consulted and recommended asa, high intensity statin, and aggressive risk factor modification thus patient discharged on amlodipine and lisinopril and can f/u with pcp for ongoing BP management.      Patient states she is doing well with current medications at this time.  She does have quite a bit of deficit in her  left side.  She is still waiting for physical therapy to be arranged.  Will check on this today to adequately get this set up.  Does have follow-up scheduled with neurology.    Patient states she is having a lot of increased anxiety.  She has never taken anything for anxiety before.    Agree with discharge plan continue as followed.     The following portions of the patient's history were reviewed and updated as appropriate: allergies, current medications, past family history, past medical history, past social history, past surgical history, and problem list.    Review of Systems    Objective   Physical Exam  Constitutional:       General: She is not in acute distress.     Appearance: Normal appearance. She is well-developed and well-groomed. She is not ill-appearing, toxic-appearing or diaphoretic.   HENT:      Head: Normocephalic.      Nose: Nose normal. No congestion or rhinorrhea.      Mouth/Throat:      Mouth: Mucous membranes are moist.      Pharynx: Oropharynx is clear. No oropharyngeal exudate or posterior oropharyngeal erythema.   Eyes:      General: Lids are normal.         Right eye: No discharge.         Left eye: No discharge.      Extraocular Movements: Extraocular movements intact.      Pupils: Pupils are equal, round, and reactive to light.   Neck:      Vascular: No carotid bruit.   Cardiovascular:      Rate and Rhythm: Normal rate and regular rhythm.      Pulses: Normal pulses.      Heart sounds: Normal heart sounds. No murmur heard.     No friction rub. No gallop.   Pulmonary:      Effort: Pulmonary effort is normal. No respiratory distress.      Breath sounds: Normal breath sounds. No stridor. No wheezing, rhonchi or rales.   Chest:      Chest wall: No tenderness.   Abdominal:      General: Bowel sounds are normal. There is no distension.      Palpations: Abdomen is soft. There is no mass.      Tenderness: There is no abdominal tenderness. There is no right CVA tenderness, left CVA tenderness,  guarding or rebound.      Hernia: No hernia is present.   Musculoskeletal:         General: No swelling or tenderness. Normal range of motion.      Left shoulder: Decreased range of motion.      Cervical back: Normal range of motion and neck supple. No rigidity or tenderness.      Right lower leg: No edema.      Left lower leg: No edema.   Lymphadenopathy:      Cervical: No cervical adenopathy.   Skin:     General: Skin is warm.      Capillary Refill: Capillary refill takes less than 2 seconds.      Coloration: Skin is not jaundiced.      Findings: No bruising, erythema or rash.   Neurological:      General: No focal deficit present.      Mental Status: She is alert and oriented to person, place, and time.      Motor: Motor function is intact. No weakness.      Coordination: Coordination is intact.      Gait: Gait is intact. Gait normal.   Psychiatric:         Attention and Perception: Attention normal.         Mood and Affect: Mood normal.         Speech: Speech normal.         Behavior: Behavior normal.         Cognition and Memory: Cognition normal.         Judgment: Judgment normal.       Assessment & Plan   Diagnoses and all orders for this visit:    1. Cerebrovascular accident (CVA), unspecified mechanism (Primary)  Comments:  Continue current medications keep follow-up with neurology as scheduled.    2. Encounter for screening mammogram for malignant neoplasm of breast  -     Mammo Screening Digital Tomosynthesis Bilateral With CAD    3. Screen for colon cancer  -     Cologuard - Stool, Per Rectum; Future    4. Anxiety  -     escitalopram (Lexapro) 10 MG tablet; Take 1 tablet by mouth Daily.  Dispense: 30 tablet; Refill: 2               Procedures     Return in about 4 weeks (around 6/20/2024), or if symptoms worsen or fail to improve.     This document has been electronically signed by LISA Zabala  Lavonne 3, 2024 10:45 EDT

## 2024-05-23 NOTE — DISCHARGE SUMMARY
[facility] DISCHARGE SUMMARY    Patient Identification:  Name:  Diane Meyers  Age:  54 y.o.  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 female with above noted PMH that presented initially with left-sided weakness with aphasia and concern for acute CVA status post TNK given at OSH. Imaging w/u revealed no ischemia or perfusion deficits with sx resolution by time of transfer however did have recurrent episode of left sided numbness shortly after admission, repeat CT imaging again remained negative and sx resolved spontaneously within a few minutes w/o intervention. She was seen by pt/ot/SLP as part of stroke bundle and is being discharge home to f/u closely as outpatient with neurology.    Suspect patient either had TIA or aborted CVA via TNK at OSH thus neurology consulted and recommended asa, high intensity statin, and aggressive risk factor modification thus patient discharged on amlodipine and lisinopril and can f/u with pcp for ongoing BP management.         VITAL SIGNS:        on   ;        Body mass index is 29.45 kg/m².  Wt Readings from Last 3 Encounters:   24 68.4 kg (150 lb 12.7 oz)       PHYSICAL EXAM:  Physical Exam  Vitals and nursing note reviewed.   Constitutional:       General: She is not in acute distress.  HENT:      Head: Normocephalic and atraumatic.   Cardiovascular:      Rate and Rhythm: Normal rate and regular rhythm.      Pulses: Normal pulses.       Heart sounds: No murmur heard.  Pulmonary:      Effort: Pulmonary effort is normal. No respiratory distress.   Abdominal:      General: There is no distension.      Palpations: Abdomen is soft. There is no mass.   Musculoskeletal:      Right lower leg: No edema.      Left lower leg: No edema.   Skin:     General: Skin is warm and dry.   Neurological:      General: No focal deficit present.      Mental Status: She is alert. Mental status is at baseline.      Cranial Nerves: No cranial nerve deficit.   Psychiatric:         Mood and Affect: Mood normal.         Behavior: Behavior normal.          DISCHARGE DISPOSITION   Stable       Discharge Medications        New Medications        Instructions Start Date   amLODIPine 5 MG tablet  Commonly known as: NORVASC   5 mg, Oral, Every 24 Hours Scheduled      Aspirin Low Dose 81 MG EC tablet  Generic drug: aspirin   81 mg, Oral, Daily      atorvastatin 80 MG tablet  Commonly known as: LIPITOR   80 mg, Oral, Nightly      lisinopril 5 MG tablet  Commonly known as: PRINIVIL,ZESTRIL   5 mg, Oral, Every 24 Hours Scheduled                 Additional Instructions for the Follow-ups that You Need to Schedule       Discharge Follow-up with PCP   As directed       Currently Documented PCP:    Provider, No Known    PCP Phone Number:    [phone]     Follow Up Details: within 1 week        Discharge Follow-up with Specified Provider: Neurology, stroke; 6 Weeks   As directed      To: Neurology, stroke   Follow Up: 6 Weeks   Follow Up Details: f/u tia        Referral to Occupational Therapy   As directed      Specialty needed:  (f/u TIA)   Follow-up needed: Yes        Referral to Physical Therapy   As directed      Specialty needed: Evaluate and treat (TIA)   Follow-up needed: Yes               Follow-up Information       [facility] Follow up.    Specialty: Neurology  Why: f/u in 2-3 months  Contact information:  [address] [facility]  [address]  [phone]             Provider, No Known .    Why: within 1 week  Contact information:  [facility] [address]  [phone]               Provider, No Known .    Contact information:  [facility] [address]  [phone]                              TEST  RESULTS PENDING AT DISCHARGE      I will notify patient via phone-call should above lab work result with any significant abnormal findings     CODE STATUS  Code Status and Medical Interventions:   Ordered at: 05/19/24 0257     Code Status (Patient has no pulse and is not breathing):    CPR (Attempt to Resuscitate)     Medical Interventions (Patient has pulse or is breathing):    Full Support             Abilio Ruiz MD  [facility] Hospitalist  05/22/24  21:37 EDT    Please note that this discharge summary required more than 30 minutes to complete.

## 2024-05-23 NOTE — TELEPHONE ENCOUNTER
CCU NURSE CALLED FROM [facility] AND REQUESTED THAT [facility] PUT IN A REFERRAL FOR HALTER MONITOR AT HER VISIT TODAY. SHE WAS ORDERED ONE AT THE HOSPITAL BY BRIDGER TOMLIN BUT IT DID NOT GET DONE.

## 2024-05-28 ENCOUNTER — PATIENT ROUNDING (BHMG ONLY) (OUTPATIENT)
Dept: FAMILY MEDICINE CLINIC | Facility: CLINIC | Age: 54
End: 2024-05-28

## 2024-05-28 NOTE — PROGRESS NOTES
May 28, 2024    Hello, may I speak with Diane Meyers?    My name is   Anna    I am  with [facility] [place]  [facility]  [address] [place] [address] [phone].    Before we get started may I verify your date of birth? 1970 yes     I am calling to officially welcome you to our practice and ask about your recent visit. Is this a good time to talk? Yes     Tell me about your visit with us. What things went well?  the visit went good        We're always looking for ways to make our patients' experiences even better. Do you have recommendations on ways we may improve?  no     Overall were you satisfied with your first visit to our practice? Yes        I appreciate you taking the time to speak with me today. Is there anything else I can do for you?   No     Thank you, and have a great day.

## 2024-05-31 ENCOUNTER — READMISSION MANAGEMENT (OUTPATIENT)
Dept: CALL CENTER | Facility: HOSPITAL | Age: 54
End: 2024-05-31

## 2024-05-31 NOTE — OUTREACH NOTE
Stroke Week 2 Survey      Flowsheet Row Responses   [facility] patient discharged from? [facility]   Does the patient have one of the following disease processes/diagnoses(primary or secondary)? Stroke   Week 2 attempt successful? No   Call start time 1605   Unsuccessful attempts Attempt 1            Maria Ines PAK - Registered Nurse

## 2024-06-04 ENCOUNTER — TELEPHONE (OUTPATIENT)
Dept: FAMILY MEDICINE CLINIC | Facility: CLINIC | Age: 54
End: 2024-06-04

## 2024-06-04 NOTE — TELEPHONE ENCOUNTER
Caller: PAULA    Relationship: [facility]    Best call back number: [phone]     [phone]    What orders are you requesting (i.e. lab or imaging): Z12.31 DX /MAMMOGRAM   AND ALSO SEND DEMO SHEET   In what timeframe would the patient need to come in: ASAP APPT TOMORROW    Where will you receive your lab/imaging services: [facility]    Additional notes:

## 2024-06-05 ENCOUNTER — READMISSION MANAGEMENT (OUTPATIENT)
Dept: CALL CENTER | Facility: HOSPITAL | Age: 54
End: 2024-06-05

## 2024-06-05 NOTE — OUTREACH NOTE
General Surgery Week 2 Survey      Flowsheet Row Responses   [facility] patient discharged from? [facility]   Does the patient have one of the following disease processes/diagnoses(primary or secondary)? Stroke   Call start time 1436   Call end time 1440   Discharge diagnosis CVA (cerebral vascular accident)   Meds reviewed with patient/caregiver? Yes   Is the patient having any side effects they believe may be caused by any medication additions or changes? No   Is the patient taking all medications as directed (includes completed medication regime)? Yes   Has the patient kept scheduled appointments due by today? Yes   Has home health visited the patient within 72 hours of discharge? N/A   Psychosocial issues? No   Did the patient receive a copy of their discharge instructions? Yes   Nursing interventions Reviewed instructions with patient   What is the patient's perception of their health status since discharge? Improving   Is the patient/caregiver able to teach back the hierarchy of who to call/visit for symptoms/problems? PCP, Specialist, Home health nurse, Urgent Care, ED, 911 Yes   Is the patient interested in additional calls from an ambulatory ? No   Would this patient benefit from a Referral to Amb Social Work? No   Wrap up additional comments Patient reports she is improving.   Call end time 1440            Mary PUGH - Registered Nurse

## 2024-06-13 ENCOUNTER — TELEPHONE (OUTPATIENT)
Dept: NEUROLOGY | Facility: CLINIC | Age: 54
End: 2024-06-13

## 2024-06-13 NOTE — LETTER
June 13, 2024     Patient: Diane Meyers   YOB: 1970   Date of Visit: 6/13/2024       To Whom It May Concern:    It is my medical opinion that Diane Meyers should remain out of work until after she follows up with our clinic on 07/26/2024 .           Sincerely,        LISA Gómez    CC:   No Recipients

## 2024-06-13 NOTE — TELEPHONE ENCOUNTER
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  PT WILL CALL BACK WITH FAX # OF WHERE TO SEND THIS TO.  PLEASE CALL PT TO ADVISE     THANK YOU

## 2024-06-13 NOTE — TELEPHONE ENCOUNTER
Caller: Diane Meyers    Relationship: Self    Best call back number: [phone]    What is the best time to reach you: ANY    Who are you requesting to speak with (clinical staff, provider,  specific staff member): ANY    What was the call regarding: WORK NOTE - STATES HER JOB DOES NOT HAVE A FAX NUMBER. ASKED IF NOTE CAN BE UPLOADED TO [employer]. PLEASE ADVISE WHEN UPLOADED, THANK YOU.

## 2024-07-24 ENCOUNTER — TELEPHONE (OUTPATIENT)
Dept: NEUROLOGY | Facility: CLINIC | Age: 54
End: 2024-07-24
Payer: COMMERCIAL

## 2024-07-24 NOTE — TELEPHONE ENCOUNTER
TTC PT TO SCHEDULE SOONER APPT IN [place] ID NEEDED.  NOT SET UP. PT CONTACT OBEY ELMORE NOT SET UP. SPOKE W/CONTACT NIMO, SHE WILL HAVE PT CALL. TRANSFER TO [facility] IN CLINIC.

## 2024-07-24 NOTE — TELEPHONE ENCOUNTER
PATIENT CALLED - ADVISED LETTER IS IN HER MYCHART.    SHE WOULD LIKE TO KNOW IF SHE CAN BE SEEN IN [place] FOR A SOONER APPT THAN NOVEMBER    PLEASE ADVISE PATIENT

## 2024-07-24 NOTE — TELEPHONE ENCOUNTER
Caller: Diane Meyers    Relationship: Self    Best call back number: [phone]    What form or medical record are you requesting: LETTER FROM PROVIDER THAT SHE UNDER HIS CARE    Who is requesting this form or medical record from you: PATIENT & JOB    How would you like to receive the form or medical records (pick-up, mail, fax):   MY CHART    If fax, what is the fax number:     If mail, what is the address:     If pick-up, provide patient with address and location details    Timeframe paperwork needed: ASAP    Additional notes: STATED SHE GOT A CALL TODAY (7-24-24) FROM CLINIC THAT THEY HAD TO CANCEL FRIDAY'S APPT (7-26-24) DUE TO PROVIDER WILL BE OUT OF THE OFFICE.   STATED SHE IS NOW SCHEDULED FOR 11-22-24.  STATED SHE NEEDS A LETTER THAT SHE IS UNDER HIS CARE FOR HER JOB.   STATED IF THEY DO NOT GET THE LETTER, SHE WILL LOSE HER JOB. STATED TO PLEASE SEND LETTER VIA MY CHART.    PLEASE ADVISE

## 2024-07-30 ENCOUNTER — TELEPHONE (OUTPATIENT)
Dept: NEUROLOGY | Facility: CLINIC | Age: 54
End: 2024-07-30
Payer: COMMERCIAL

## 2024-07-30 NOTE — TELEPHONE ENCOUNTER
TTC PT TO SCHEDULE SOONER APPT IN [place] ID NEEDED. VM NOT SET UP. PT CONTACT OBEY ELMORE NOT SET UP. LVM CONTACT NIMO TO HAVE PT CALL FOR SOONER APPT. SENT PT MY CHART MESSAGE. TRANSFER TO [facility] IN CLINIC.

## 2024-07-30 NOTE — TELEPHONE ENCOUNTER
Caller:   Best call back number: [phone]    What is the best time to reach you: ANY    Who are you requesting to speak with (clinical staff, provider,  specific staff member): RUI    Do you know the name of the person who called: RUI    What was the call regarding: PT RETURING RUI CALL ABOUT A SOONER APPT

## 2024-08-05 NOTE — TELEPHONE ENCOUNTER
SPOKE WITH PT TO SCHEDULE SOONER APPT IN [place]. SHE STATES SHE DOESN'T HAVE THE MONEY FOR THE DRIVE. SHE SAID SHE HAS APPLIED FOR UNEMPLOYMENT AND WILL CALL IF THE SITUATION CHANGES. SHE IS ON THE WAITLIST.

## 2024-08-05 NOTE — TELEPHONE ENCOUNTER
SPOKE WITH PT TO SCHEDULE SOONER APPT IN [place]. SHE STATES SHE DOESN'T HAVE THE MONEY FOR THE DRIVE. SHE SAID SHE HAS APPLIED FOR UNEMPLOYMENT AND WILL CALL IF THE SITUATION CHANGES. SHE IS ON THE WAITLIST.

## 2024-08-29 ENCOUNTER — TELEPHONE (OUTPATIENT)
Dept: TELEMETRY | Facility: HOSPITAL | Age: 54
End: 2024-08-29
Payer: COMMERCIAL

## 2024-08-29 NOTE — TELEPHONE ENCOUNTER
Called pt for 90 day f/u MRS. Score 4. Pt states she uses a cane, has freq falls,and had difficulty using her arm also. Her drop foot has worsened. She voices that she did not have therapy for 3 months because her pcp did not set it up for her because she did not have insurance.  She stated she has a new pcp now and just started in therapy. And that she has memory loss and cannot remember anything about her hospital stay here and experiences memory loss frequently. She voiced she would like to see the neurologist LISA prior to her appt scheduled in Nov. Santy Santana notified of request.

## 2024-10-04 ENCOUNTER — TELEPHONE (OUTPATIENT)
Dept: FAMILY MEDICINE CLINIC | Facility: CLINIC | Age: 54
End: 2024-10-04
Payer: COMMERCIAL

## 2024-11-21 NOTE — PROGRESS NOTES
New Patient Office Visit      Encounter Date: 2024   Patient Name: Diane Meyers  : 1970   MRN: [id number]   PCP: Iqra Mclean APRN    Referring Provider: No ref. provider found     Chief Complaint:  No chief complaint on file.      History of Present Illness: Diane Meyers is a 54 y.o. female with known medical diagnoses of COPD, hypertension, tobacco use, remote leukemia in remission who presented with left-sided weakness, headache, and vision changes to an OSH for which she received TNK before being transferred to our facility. CTA at outside facility without evidence of LVO, did show some atherosclerotic disease but no hemodynamically significant stenosis. CT head negative for acute changes. Subsequent MRI brain was negative for any recent ischemia. The next  afternoon patient had acute onset of blurry vision and slurred speech and worsening left-sided weakness while sitting up in a chair, blood pressure up to 170s over 110s. She had repeat stat CT which was negative for any acute changes.  TTE was okay.  By the time time she was evaluated by Tele-Neurology symptoms had improved.  Symptoms felt likely TIA versus aborted stroke s/p TNK.  Discharge she was advised to take a daily aspirin and high intensity statin for secondary stroke prevention.    Today patient complains of frequent falls, gait instability has to use a cane, r/left arm/leg weakness along with sensory loss in those extremities.  Also complains of memory loss, states she has zero recollection of her hospital stay, can only start to remember about two weeks after being hospitalized.  States she often forgets things from day to day, and would possibly not remember seeing my today.  She is compliant with her medication regimen, no issues with cost.  These symptoms are inconsistent with her imaging, as MRI was relatively benign appearing, and last hospital exam was better. She tells me she has been unable to work since  "hospitalization and has been fired from her job.  She has started the process of applying for disability. She has long standing back pain, denies any serious injuries. Also complains of blurry/double vision on occasion.     Stroke Risk Factors: hyperlipidemia, hypertension, and smoking      Subjective      Review of Systems:   Review of Systems   Constitutional:  Positive for activity change and fatigue. Negative for chills and fever.   HENT:  Negative for trouble swallowing.    Eyes:  Positive for blurred vision and double vision.   Respiratory: Negative.     Cardiovascular: Negative.    Musculoskeletal:  Positive for back pain.   Skin: Negative.    Neurological:  Positive for weakness, numbness, memory problem and confusion. Negative for seizures, facial asymmetry, speech difficulty and light-headedness.       Past Medical History:   Past Medical History:   Diagnosis Date    Asthma     COPD (chronic obstructive pulmonary disease)     History of stroke     Hypertension        Past Surgical History:   Past Surgical History:   Procedure Laterality Date    APPENDECTOMY       SECTION      x2    TUBAL ABDOMINAL LIGATION         Family History:   Family History   Problem Relation Age of Onset    Hypertension Father     Hypertension Brother        Social History:   Social History     Socioeconomic History    Marital status: Single   Tobacco Use    Smoking status: Every Day     Current packs/day: 0.00     Average packs/day: 1 pack/day for 39.4 years (39.4 ttl pk-yrs)     Types: Cigarettes     Start date:      Last attempt to quit: 2024     Years since quittin.5     Passive exposure: Current    Smokeless tobacco: Never   Vaping Use    Vaping status: Never Used   Substance and Sexual Activity    Alcohol use: Never    Drug use: Yes     Frequency: 2.0 times per week     Types: Marijuana     Comment: \"gummies for anxiety\"    Sexual activity: Defer       Medications:     Current Outpatient Medications:     " atorvastatin (LIPITOR) 80 MG tablet, Take 1 tablet by mouth Every Night., Disp: 30 tablet, Rfl: 1    Cariprazine HCl (Vraylar) 1.5 MG capsule capsule, Take 1 capsule by mouth Daily., Disp: , Rfl:     clopidogrel (PLAVIX) 75 MG tablet, Take 1 tablet by mouth Daily., Disp: , Rfl:     gabapentin (NEURONTIN) 400 MG capsule, Take 1 capsule by mouth 2 (Two) Times a Day., Disp: , Rfl:     hydrOXYzine (ATARAX) 10 MG tablet, Take 1 tablet by mouth 3 (Three) Times a Day As Needed for Itching., Disp: , Rfl:     ipratropium (ATROVENT) 0.02 % nebulizer solution, Take 2.5 mL by nebulization 4 (Four) Times a Day., Disp: , Rfl:     lisinopril (PRINIVIL,ZESTRIL) 5 MG tablet, Take 1 tablet by mouth Daily. (Patient taking differently: Take 4 tablets by mouth Daily.), Disp: 30 tablet, Rfl: 1    Loratadine 10 MG capsule, Take  by mouth., Disp: , Rfl:     prazosin (MINIPRESS) 2 MG capsule, Take 1 capsule by mouth Every Night., Disp: , Rfl:     predniSONE (DELTASONE) 10 MG tablet, Take 1 tablet by mouth Daily., Disp: , Rfl:     tiotropium (SPIRIVA) 18 MCG per inhalation capsule, Place 1 capsule into inhaler and inhale Daily., Disp: , Rfl:     vitamin D (ERGOCALCIFEROL) 1.25 MG (56440 UT) capsule capsule, Take 1 capsule by mouth 1 (One) Time Per Week., Disp: , Rfl:     amLODIPine (NORVASC) 5 MG tablet, Take 1 tablet by mouth Daily., Disp: 30 tablet, Rfl: 1    escitalopram (Lexapro) 10 MG tablet, Take 1 tablet by mouth Daily., Disp: 30 tablet, Rfl: 2    Allergies:   No Known Allergies    Objective     Physical Exam:  Vital Signs:   Vitals:    11/22/24 1357   BP: 151/89   BP Location: Right arm   Patient Position: Sitting   Cuff Size: Small Adult   Pulse: 117   SpO2: 97%   Weight: 83.3 kg (183 lb 9.6 oz)     Body mass index is 35.86 kg/m².     Physical Exam  Constitutional:       General: She is awake.   HENT:      Head: Normocephalic.      Mouth/Throat:      Mouth: Mucous membranes are moist.      Pharynx: Oropharynx is clear.   Eyes:       General: Lids are normal.      Extraocular Movements: Extraocular movements intact.      Pupils: Pupils are equal, round, and reactive to light.   Cardiovascular:      Rate and Rhythm: Tachycardia present.   Pulmonary:      Effort: Pulmonary effort is normal. No respiratory distress.   Skin:     General: Skin is warm and dry.   Neurological:      Mental Status: She is alert.   Psychiatric:         Mood and Affect: Mood normal.         Speech: Speech normal.         Behavior: Behavior normal.       Neurological Exam  Mental Status  Awake and alert. Oriented to person, place, time and situation. Recent and remote memory are intact. Speech is normal. Language is fluent with no aphasia. Attention and concentration are normal. Fund of knowledge is appropriate for level of education.    Cranial Nerves  CN II: Visual fields full to confrontation.  CN III, IV, VI: Extraocular movements intact bilaterally. Normal lids and orbits bilaterally. Pupils equal round and reactive to light bilaterally.  CN V: Facial sensation is normal.  CN VII: Full and symmetric facial movement.  CN IX, X: Palate elevates symmetrically  CN XI: Shoulder shrug strength is normal.  CN XII: Tongue midline without atrophy or fasciculations.    Motor  Normal muscle bulk throughout. Normal muscle tone. No abnormal involuntary movements.  Left upper/lower extremity drift 3/5 strength in both  Left hand clenched into fist, I was unable to extend her fingers due to resistance.    Coordination    No obvious dysmetria.    Gait  Casual gait: Narrow stance. Reduced stride length. Hesitant gait.  Uses cane.       NIH Stroke Scale    1a  Level of consciousness: 0=alert; keenly responsive   1b. LOC questions:  0=Answers both questions correctly    1c. LOC commands: 0=Performs both tasks correctly   2.  Best Gaze: 0=normal   3. Visual: 0=No visual loss   4. Facial Palsy: 0=Normal symmetric movement   5a. Motor left arm: 1=Drift, limb holds 90 (or 45) degrees but  drifts down before full 10 seconds: does not hit bed   5b.  Motor right arm: 0=No drift, limb holds 90 (or 45) degrees for full 10 seconds   6a. Motor left le=Drift, limb holds 90 (or 45) degrees but drifts down before full 10 seconds: does not hit bed   6b  Motor right le=No drift, limb holds 90 (or 45) degrees for full 10 seconds   7. Limb Ataxia: 0=Absent   8.  Sensory: 1=Mild to moderate sensory loss; patient feels pinprick is less sharp or is dull on the affected side; there is a loss of superficial pain with pinprick but patient is aware She is being touched   9. Best Language:  0=No aphasia, normal   10. Dysarthria: 0=Normal   11. Extinction and Inattention: 0=No abnormality    Total:   3         Modified [place] Score: 3        0  No Symptoms    1 No significant disability. Able to carry out all usual activities, despite some symptoms.    2 Slight disability. Able to look after own affairs without assistance, but unable to carry out all previous activities.    3 Moderate disability. Requires some help, but able to walk unassisted.    4 Moderately severe disability. Unable to attend to own bodily needs without assistance, and unable to walk unassisted.    5 Severe disability. Requires constant nursing care and attention, bedridden, incontinent.    6 Dead      PHQ-9 Depression Screening  Little interest or pleasure in doing things? Not at all   Feeling down, depressed, or hopeless? Almost all   PHQ-2 Total Score 3   Trouble falling or staying asleep, or sleeping too much? Almost all   Feeling tired or having little energy? Almost all   Poor appetite or overeating? Almost all   Feeling bad about yourself - or that you are a failure or have let yourself or your family down? Almost all   Trouble concentrating on things, such as reading the newspaper or watching television? Almost all   Moving or speaking so slowly that other people could have noticed? Or the opposite - being so fidgety or restless that  you have been moving around a lot more than usual? Almost all   Thoughts that you would be better off dead, or of hurting yourself in some way? Not at all   PHQ-9 Total Score 21   If you checked off any problems, how difficult have these problems made it for you to do your work, take care of things at home, or get along with other people? Extremely difficult     Imaging Reviewed:     Results for orders placed during the hospital encounter of 05/19/24    Adult Transthoracic Echo Complete W/ Cont if Necessary Per Protocol (With Agitated Saline)    Interpretation Summary    Left ventricular ejection fraction appears to be 61 - 65%.    Left ventricular diastolic function was normal.    Saline test results are negative for right to left atrial level shunt.    Estimated right ventricular systolic pressure from tricuspid regurgitation is normal (<35 mmHg).    No significant valvular abnormalities noted.    US Carotid Bilateral  Narrative: PROCEDURE: US CAROTID BILATERAL-     HISTORY: Atherosclerotic vascular disease; G45.9-Transient cerebral  ischemic attack, unspecified     Technique: Real-time imaging was performed of the extracranial carotid  arteries in transverse and longitudinal planes, with color duplex  evaluation of blood flow velocity. Spectral analysis was performed. The  cervicovertebral arteries were also examined. Stenosis evaluation is  based on validated velocity criteria.     COMPARISON: None.     Right carotid system:  CCA: 75 cm/s  ICA: 101 cm/s  ECA: 123 cm/s  Vertebral artery: Antegrade  ICA/CCA ratio: 1.3  Minimal plaque is identified at the bifurcation.     Left carotid system:  CCA: 101 cm/s  ICA: 95 cm/s  ECA: 110 cm/s  Vertebral artery: Antegrade  ICA/CCA ratio: 0.9  Minimal plaque is identified at the bifurcation.        Impression: No hemodynamically significant stenosis identified.           This report was finalized on 5/20/2024 2:40 PM by Inna Alfonso M.D..       No radiology results  for the last 90 days.   Laboratory Results:    Lab Results   Component Value Date    HGBA1C 5.60 05/19/2024     Lab Results   Component Value Date    WBC 6.00 05/19/2024    HGB 13.2 05/19/2024    HCT 40.3 05/19/2024    MCV 88.8 05/19/2024     05/19/2024      Lab Results   Component Value Date    GLUCOSE 178 (H) 05/19/2024    BUN 11 05/19/2024    CREATININE 0.84 05/19/2024    BCR 13.1 05/19/2024    K 3.8 05/19/2024    CO2 24.9 05/19/2024    CALCIUM 9.1 05/19/2024    ALBUMIN 4.1 05/19/2024    AST 12 05/19/2024    ALT 10 05/19/2024      Lab Results   Component Value Date    CHOL 198 05/19/2024     Lab Results   Component Value Date    TRIG 142 05/19/2024     Lab Results   Component Value Date    HDL 38 (L) 05/19/2024     Lab Results   Component Value Date     (H) 05/19/2024      Lab Results   Component Value Date    TSH 2.080 05/19/2024     Lab Results   Component Value Date    FOLATE 6.69 05/19/2024     Lab Results   Component Value Date    [id number] 397 05/19/2024               Assessment / Plan      Assessment/Plan:   Diagnoses and all orders for this visit:    1. Personal history of TIA (transient ischemic attack) (Primary)  -     Holter Monitor - 72 Hour Up To 15 Days; Future    2. Primary hypertension    3. Mixed hyperlipidemia    4. Hemiplegia of left nondominant side as late effect of cerebrovascular disease, unspecified cerebrovascular disease type, unspecified hemiplegia type  -     MRI Brain With & Without Contrast; Future  -     MRI Cervical Spine Without Contrast; Future  -     MRI Thoracic Spine Without Contrast; Future  -     MRI Lumbar Spine Without Contrast; Future       -Holter monitor ordered  -Continue Plavix and high intensity statin  -BP today 151/89, typically 130's systolic per patient, asked her to monitor  -She had updated labs on her phone from PCP.  LDL was down to 83 from 134  -Spoke with and provided patient information regarding a Mediterranean diet  -Will MRI brain and  whole spine to evaluate her left sided hemiplegia/foot drop/memory loss.  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  Also discussed the signs symptoms that would warrant the patient return back to the emergency department including unilateral weakness, unilateral numbness, visual disturbances, loss of balance, speech difficulties, and/or a sudden severe headache.     Blood Pressure control to < 130/80  Goal LDL <70  Goal A1c < 7.0  Call 911 for any stroke symptoms    Follow Up:   Return in about 3 months (around 2/22/2025).    CARLITOS [facility] Neuro Stroke     This document has been electronically signed by LISA Gómez  November 22, 2024 15:19 EST    Please note that portions of this note were completed with a voice recognition program. Efforts were made to edit dictation, but occasionally words are mistranscribed.

## 2024-11-22 ENCOUNTER — OFFICE VISIT (OUTPATIENT)
Dept: NEUROLOGY | Facility: CLINIC | Age: 54
End: 2024-11-22
Payer: COMMERCIAL

## 2024-11-22 VITALS
OXYGEN SATURATION: 97 % | WEIGHT: 183.6 LBS | HEART RATE: 117 BPM | DIASTOLIC BLOOD PRESSURE: 89 MMHG | SYSTOLIC BLOOD PRESSURE: 151 MMHG | BODY MASS INDEX: 35.86 KG/M2

## 2024-11-22 DIAGNOSIS — Z86.73 PERSONAL HISTORY OF TIA (TRANSIENT ISCHEMIC ATTACK): Primary | ICD-10-CM

## 2024-11-22 DIAGNOSIS — I10 PRIMARY HYPERTENSION: ICD-10-CM

## 2024-11-22 DIAGNOSIS — I69.954 HEMIPLEGIA OF LEFT NONDOMINANT SIDE AS LATE EFFECT OF CEREBROVASCULAR DISEASE, UNSPECIFIED CEREBROVASCULAR DISEASE TYPE, UNSPECIFIED HEMIPLEGIA TYPE: ICD-10-CM

## 2024-11-22 DIAGNOSIS — E78.2 MIXED HYPERLIPIDEMIA: ICD-10-CM

## 2024-11-22 RX ORDER — PRAZOSIN HYDROCHLORIDE 2 MG/1
2 CAPSULE ORAL NIGHTLY
COMMUNITY

## 2024-11-22 RX ORDER — HYDROXYZINE HYDROCHLORIDE 10 MG/1
10 TABLET, FILM COATED ORAL 3 TIMES DAILY PRN
COMMUNITY

## 2024-11-22 RX ORDER — CLOPIDOGREL BISULFATE 75 MG/1
75 TABLET ORAL DAILY
COMMUNITY

## 2024-11-22 RX ORDER — LORATADINE 10 MG/1
CAPSULE, LIQUID FILLED ORAL
COMMUNITY

## 2024-11-22 RX ORDER — ERGOCALCIFEROL 1.25 MG/1
50000 CAPSULE, LIQUID FILLED ORAL WEEKLY
COMMUNITY

## 2024-11-22 RX ORDER — TIOTROPIUM BROMIDE 18 UG/1
1 CAPSULE ORAL; RESPIRATORY (INHALATION)
COMMUNITY

## 2024-11-22 RX ORDER — PREDNISONE 10 MG/1
10 TABLET ORAL DAILY
COMMUNITY

## 2024-11-22 RX ORDER — GABAPENTIN 400 MG/1
400 CAPSULE ORAL 2 TIMES DAILY
COMMUNITY

## 2025-02-17 DIAGNOSIS — Z86.73 PERSONAL HISTORY OF TIA (TRANSIENT ISCHEMIC ATTACK): Primary | ICD-10-CM

## 2025-02-17 DIAGNOSIS — I69.954 HEMIPLEGIA OF LEFT NONDOMINANT SIDE AS LATE EFFECT OF CEREBROVASCULAR DISEASE, UNSPECIFIED CEREBROVASCULAR DISEASE TYPE, UNSPECIFIED HEMIPLEGIA TYPE: ICD-10-CM

## 2025-02-27 ENCOUNTER — HOSPITAL ENCOUNTER (OUTPATIENT)
Dept: MRI IMAGING | Facility: HOSPITAL | Age: 55
Discharge: HOME | End: 2025-02-27
Payer: COMMERCIAL

## 2025-02-27 ENCOUNTER — HOSPITAL ENCOUNTER (OUTPATIENT)
Dept: RESPIRATORY THERAPY | Facility: HOSPITAL | Age: 55
Discharge: HOME | End: 2025-02-27
Payer: COMMERCIAL

## 2025-02-27 DIAGNOSIS — I69.954 HEMIPLEGIA OF LEFT NONDOMINANT SIDE AS LATE EFFECT OF CEREBROVASCULAR DISEASE, UNSPECIFIED CEREBROVASCULAR DISEASE TYPE, UNSPECIFIED HEMIPLEGIA TYPE: ICD-10-CM

## 2025-02-27 DIAGNOSIS — Z86.73 PERSONAL HISTORY OF TIA (TRANSIENT ISCHEMIC ATTACK): ICD-10-CM

## 2025-02-27 PROCEDURE — 70551 MRI BRAIN STEM W/O DYE: CPT

## 2025-02-27 PROCEDURE — 72148 MRI LUMBAR SPINE W/O DYE: CPT

## 2025-02-27 PROCEDURE — 72141 MRI NECK SPINE W/O DYE: CPT

## 2025-02-27 PROCEDURE — 93246 EXT ECG>7D<15D RECORDING: CPT

## 2025-02-27 PROCEDURE — 72146 MRI CHEST SPINE W/O DYE: CPT

## 2025-03-11 DIAGNOSIS — M51.26 PROTRUSION OF LUMBAR INTERVERTEBRAL DISC: Primary | ICD-10-CM

## 2025-03-11 DIAGNOSIS — G81.14 LEFT SPASTIC HEMIPLEGIA: ICD-10-CM

## 2025-03-13 ENCOUNTER — OFFICE VISIT (OUTPATIENT)
Dept: NEUROSURGERY | Facility: CLINIC | Age: 55
End: 2025-03-13
Payer: COMMERCIAL

## 2025-03-13 VITALS — TEMPERATURE: 97.5 F | BODY MASS INDEX: 35.73 KG/M2 | WEIGHT: 182 LBS | HEIGHT: 60 IN

## 2025-03-13 DIAGNOSIS — M51.26 LUMBAR DISC HERNIATION: Primary | ICD-10-CM

## 2025-03-13 NOTE — PROGRESS NOTES
Patient: Diane Meyers  : 1970    Primary Care Provider: Riya Hoff DO    Requesting Provider: As above      Chief Complaint: Back Pain and Leg Pain (Left leg Pain)      History of Present Illness: This is a 54 y.o. female who presents for evaluation of left leg pain and leg weakness.  The patient sustained a major stroke in May of last year which resulted in significant left-sided weakness.  She is regaining some function of the left arm, but does continue to have significant weakness on the left leg.  She states she has been dealing with numbness and tingling of the left foot and distal lower extremity since the stroke.  Prior to her stroke, she would occasionally get shooting pain down the left leg.  She states she still does experience this occasionally, but it only lasts a few minutes and happens rarely.  She denies any significant right leg pain or significant lower back pain.    PMHX  Allergies:  No Known Allergies  Medications    Current Outpatient Medications:     amLODIPine (NORVASC) 5 MG tablet, Take 1 tablet by mouth Daily., Disp: 30 tablet, Rfl: 1    atorvastatin (LIPITOR) 80 MG tablet, Take 1 tablet by mouth Every Night., Disp: 30 tablet, Rfl: 1    Cariprazine HCl (Vraylar) 1.5 MG capsule capsule, Take 1 capsule by mouth Daily., Disp: , Rfl:     clopidogrel (PLAVIX) 75 MG tablet, Take 1 tablet by mouth Daily., Disp: , Rfl:     escitalopram (Lexapro) 10 MG tablet, Take 1 tablet by mouth Daily., Disp: 30 tablet, Rfl: 2    gabapentin (NEURONTIN) 400 MG capsule, Take 1 capsule by mouth 2 (Two) Times a Day., Disp: , Rfl:     hydrOXYzine (ATARAX) 10 MG tablet, Take 1 tablet by mouth 3 (Three) Times a Day As Needed for Itching., Disp: , Rfl:     ipratropium (ATROVENT) 0.02 % nebulizer solution, Take 2.5 mL by nebulization 4 (Four) Times a Day., Disp: , Rfl:     lisinopril (PRINIVIL,ZESTRIL) 5 MG tablet, Take 1 tablet by mouth Daily. (Patient taking differently: Take 4 tablets by mouth  Daily.), Disp: 30 tablet, Rfl: 1    Loratadine 10 MG capsule, Take  by mouth., Disp: , Rfl:     predniSONE (DELTASONE) 10 MG tablet, Take 1 tablet by mouth Daily., Disp: , Rfl:     tiotropium (SPIRIVA) 18 MCG per inhalation capsule, Place 1 capsule into inhaler and inhale Daily., Disp: , Rfl:     vitamin D (ERGOCALCIFEROL) 1.25 MG (88778 UT) capsule capsule, Take 1 capsule by mouth 1 (One) Time Per Week., Disp: , Rfl:   Past Medical History:  Past Medical History:   Diagnosis Date    Anxiety     Asthma     Claustrophobia     COPD (chronic obstructive pulmonary disease)     Depression     Difficulty walking     Headache, tension-type     History of stroke     Hyperlipidemia 2024    Hypertension     Low back pain     For a long time    Memory loss 2024    Migraine     I get them on and off for years    Vision loss 2024     Past Surgical History:  Past Surgical History:   Procedure Laterality Date    APPENDECTOMY      CARDIAC CATHETERIZATION      CEREBRAL ANGIOGRAM      About 12 years ago     SECTION      x2    TUBAL ABDOMINAL LIGATION       Social Hx:  Social History     Tobacco Use    Smoking status: Every Day     Current packs/day: 0.00     Average packs/day: 1 pack/day for 39.4 years (39.4 ttl pk-yrs)     Types: Cigarettes     Start date: 1985     Last attempt to quit: 2024     Years since quittin.8     Passive exposure: Current    Smokeless tobacco: Never   Vaping Use    Vaping status: Never Used   Substance Use Topics    Alcohol use: Not Currently    Drug use: Not Currently     Frequency: 2.0 times per week     Types: Marijuana     Comment: Gummies     Family Hx:  Family History   Problem Relation Age of Onset    Hypertension Father     Early death Father     Hypertension Brother     Arthritis Mother     Early death Mother     Migraines Mother     Anxiety disorder Daughter     Asthma Daughter     COPD Daughter     Depression Daughter     Mental illness Daughter      Miscarriages / Stillbirths Daughter     Early death Brother         Suicide    Hypertension Brother     Parkinsonism Maternal Uncle      Review of Systems:        Review of Systems   Constitutional:  Negative for activity change, appetite change, chills, diaphoresis, fatigue, fever and unexpected weight change.   HENT:  Negative for congestion, dental problem, drooling, ear discharge, ear pain, facial swelling, hearing loss, mouth sores, nosebleeds, postnasal drip, rhinorrhea, sinus pressure, sinus pain, sneezing, sore throat, tinnitus, trouble swallowing and voice change.    Eyes:  Negative for photophobia, pain, discharge, redness, itching and visual disturbance.   Respiratory:  Negative for apnea, cough, choking, chest tightness, shortness of breath, wheezing and stridor.    Cardiovascular:  Negative for chest pain, palpitations and leg swelling.   Gastrointestinal:  Negative for abdominal distention, abdominal pain, anal bleeding, blood in stool, constipation, diarrhea, nausea, rectal pain and vomiting.   Endocrine: Negative for cold intolerance, heat intolerance, polydipsia, polyphagia and polyuria.   Genitourinary:  Negative for decreased urine volume, difficulty urinating, dyspareunia, dysuria, enuresis, flank pain, frequency, genital sores, hematuria, menstrual problem, pelvic pain, urgency, vaginal bleeding, vaginal discharge and vaginal pain.   Musculoskeletal:  Positive for arthralgias, back pain and gait problem. Negative for joint swelling, myalgias, neck pain and neck stiffness.   Skin:  Negative for color change, pallor, rash and wound.   Allergic/Immunologic: Negative for environmental allergies, food allergies and immunocompromised state.   Neurological:  Positive for weakness and numbness. Negative for dizziness, tremors, seizures, syncope, facial asymmetry, speech difficulty, light-headedness and headaches.   Hematological:  Negative for adenopathy. Does not bruise/bleed easily.  "  Psychiatric/Behavioral:  Negative for agitation, behavioral problems, confusion, decreased concentration, dysphoric mood, hallucinations, self-injury, sleep disturbance and suicidal ideas. The patient is not nervous/anxious and is not hyperactive.         Physical Exam:   Temp 97.5 °F (36.4 °C) (Infrared)   Ht 152.4 cm (60\")   Wt 82.6 kg (182 lb)   LMP 12/31/2019 (Approximate)   BMI 35.54 kg/m²   Awake, alert and oriented x 3  Speech f/c  Opens eyes spont  Pupils 3 mm rx bilaterally  Extraocular muscles intact bilaterally  Normal sensation to light touch in all 3 distributions of CN V bilaterally  Face symmetric bilaterally  Tongue midline  5/5 in the right lower extremity.  On the left, she has 4 - of 5 in hip flexion and knee extension.  She is 0 out of 5 in dorsiflexion and plantarflexion.  Diagnostic Studies:  All neurological imaging studies were independently reviewed unless stated otherwise    Assessment/Plan:  This is a 54 y.o. female presenting with left leg pain and weakness.  In talking to the patient, I think the vast majority of her leg symptoms are related to her stroke.  I think she obviously has weakness from the stroke in the leg, but she is also experiencing neuropathic and paresthetic pain in the left leg from her stroke.  She does have occasional radicular pain that goes into her leg but this is very rare.  Her lumbar MRI shows a small disc herniation at L5-S1 to the left which abuts the traversing nerve root.  I think that this is the source of the patient's radicular symptoms, but given how infrequent they are, I do not think she requires surgical intervention.  I told her if she were to have a flareup of more consistent radicular pain, she may benefit from an epidural injection, but at this point, I do not think she requires surgical intervention.  As such, we will not schedule any further follow-up, but I told the patient to call if she develops more intense sciatica type pain in her " leg.    Diagnoses and all orders for this visit:    1. Lumbar disc herniation (Primary)      Diane Meyers  reports that she has been smoking cigarettes. She started smoking about 40 years ago. She has a 39.4 pack-year smoking history. She has been exposed to tobacco smoke. She has never used smokeless tobacco.         Joshua Arguelles MD  03/13/25  13:22 EDT

## 2025-03-18 NOTE — PROGRESS NOTES
Follow Up Office Visit      Encounter Date: 2025   Patient Name: Diane Meyers  : 1970   MRN: [id number]   PCP: Riya Hoff DO    Chief Complaint:    Chief Complaint   Patient presents with   • Personal history of TIA (transient ischemic attack)       History of Present Illness: Diane Meyers is a 54 y.o. female with known medical diagnoses of COPD, hypertension, tobacco use, remote leukemia in remission who presented with left-sided weakness, headache, and vision changes to an OSH for which she received TNK before being transferred to our facility. CTA at outside facility without evidence of LVO, did show some atherosclerotic disease but no hemodynamically significant stenosis. CT head negative for acute changes. Subsequent MRI brain was negative for any recent ischemia. The next  afternoon patient had acute onset of blurry vision and slurred speech and worsening left-sided weakness while sitting up in a chair, blood pressure up to 170s over 110s. She had repeat stat CT which was negative for any acute changes.  TTE was okay.  By the time time she was evaluated by Tele-Neurology symptoms had improved.  Symptoms felt likely TIA versus aborted stroke s/p TNK.  Discharge she was advised to take a daily aspirin and high intensity statin for secondary stroke prevention.     OV 2024: Today patient complains of frequent falls, gait instability has to use a cane, r/left arm/leg weakness along with sensory loss in those extremities.  Also complains of memory loss, states she has zero recollection of her hospital stay, can only start to remember about two weeks after being hospitalized.  States she often forgets things from day to day, and would possibly not remember seeing my today.  She is compliant with her medication regimen, no issues with cost.  These symptoms are inconsistent with her imaging, as MRI was relatively benign appearing, and last hospital exam was better. She tells me she  has been unable to work since hospitalization and has been fired from her job.  She has started the process of applying for disability. She has long standing back pain, denies any serious injuries. Also complains of blurry/double vision on occasion.     OV 03/21/2025: She continues to complain of gait instability, left arm/leg leg weakness/along with sensory loss, and memory loss.  She is compliant with her medication regimen, no issues with cost.  She denies any new stroke/TIA symptoms.  Her initial disability claim was denied, she is working with an  to appeal the decision.  After spinal MRI imaging revealed multilevel DDD and disc protrusion at L5/S1 she was referred to neurosurgery who recommended continued monitoring and consideration of epidural injections if her symptoms worsen.  Per their notes they felt these symptoms were more related to stroke.      Subjective        I have reviewed and the following portions of the patient's history were updated as appropriate: past family history, past medical history, past social history, past surgical history and problem list.    Medications:     Current Outpatient Medications:   •  amLODIPine (NORVASC) 5 MG tablet, Take 1 tablet by mouth Daily., Disp: 30 tablet, Rfl: 1  •  atorvastatin (LIPITOR) 80 MG tablet, Take 1 tablet by mouth Every Night., Disp: 30 tablet, Rfl: 1  •  Cariprazine HCl (Vraylar) 1.5 MG capsule capsule, Take 1 capsule by mouth Daily., Disp: , Rfl:   •  clopidogrel (PLAVIX) 75 MG tablet, Take 1 tablet by mouth Daily., Disp: , Rfl:   •  escitalopram (Lexapro) 10 MG tablet, Take 1 tablet by mouth Daily., Disp: 30 tablet, Rfl: 2  •  gabapentin (NEURONTIN) 400 MG capsule, Take 1 capsule by mouth 2 (Two) Times a Day., Disp: , Rfl:   •  hydrOXYzine (ATARAX) 10 MG tablet, Take 1 tablet by mouth 3 (Three) Times a Day As Needed for Itching., Disp: , Rfl:   •  ipratropium (ATROVENT) 0.02 % nebulizer solution, Take 2.5 mL by nebulization 4 (Four) Times  a Day., Disp: , Rfl:   •  lisinopril (PRINIVIL,ZESTRIL) 5 MG tablet, Take 1 tablet by mouth Daily. (Patient taking differently: Take 4 tablets by mouth Daily.), Disp: 30 tablet, Rfl: 1  •  Loratadine 10 MG capsule, Take  by mouth., Disp: , Rfl:   •  predniSONE (DELTASONE) 10 MG tablet, Take 1 tablet by mouth Daily., Disp: , Rfl:   •  tiotropium (SPIRIVA) 18 MCG per inhalation capsule, Place 1 capsule into inhaler and inhale Daily., Disp: , Rfl:   •  vitamin D (ERGOCALCIFEROL) 1.25 MG (68659 UT) capsule capsule, Take 1 capsule by mouth 1 (One) Time Per Week., Disp: , Rfl:     Allergies:   No Known Allergies    Review of Systems   Constitutional:  Positive for activity change and fatigue.   HENT: Negative.     Respiratory: Negative.     Cardiovascular: Negative.    Musculoskeletal:  Positive for arthralgias, back pain, gait problem and myalgias.   Neurological:  Positive for weakness and numbness. Negative for seizures, facial asymmetry and speech difficulty.   Psychiatric/Behavioral:  Positive for dysphoric mood.         Objective     Physical Exam:   Vital Signs:   Vitals:    03/21/25 1311   BP: 159/61   BP Location: Right arm   Patient Position: Sitting   Cuff Size: Small Adult   Pulse: 67   SpO2: 96%   Weight: 87.6 kg (193 lb 3.2 oz)     Body mass index is 37.73 kg/m².    Physical Exam  Constitutional:       General: She is awake.   HENT:      Head: Normocephalic.      Mouth/Throat:      Mouth: Mucous membranes are moist.      Pharynx: Oropharynx is clear.   Eyes:      General: Lids are normal.      Extraocular Movements: Extraocular movements intact.      Pupils: Pupils are equal, round, and reactive to light.   Cardiovascular:      Rate and Rhythm: Normal rate.   Pulmonary:      Effort: Pulmonary effort is normal. No respiratory distress.   Skin:     General: Skin is warm and dry.   Neurological:      Mental Status: She is alert.   Psychiatric:         Mood and Affect: Mood normal.         Speech: Speech normal.          Behavior: Behavior normal.       Neurological Exam  Mental Status  Awake and alert. Oriented to person, place, time and situation. Recent and remote memory are intact. Speech is normal. Language is fluent with no aphasia. Attention and concentration are normal. Fund of knowledge is appropriate for level of education.    Cranial Nerves  CN II: Visual fields full to confrontation.  CN III, IV, VI: Extraocular movements intact bilaterally. Normal lids and orbits bilaterally. Pupils equal round and reactive to light bilaterally.  CN V: Facial sensation is normal.  CN VII: Full and symmetric facial movement.  CN IX, X: Palate elevates symmetrically  CN XI: Shoulder shrug strength is normal.  CN XII: Tongue midline without atrophy or fasciculations.    Motor  Normal muscle bulk throughout. Normal muscle tone. No abnormal involuntary movements.  Left upper/lower extremity drift 3/5 strength in both  Left hand clenched into fist, I was unable to extend her fingers due to resistance.    Sensory  Light touch abnormality:   Decreased sensation to light touch in right extremities and right side of face.    Coordination    No obvious dysmetria.    Gait  Casual gait: Narrow stance. Reduced stride length. Hesitant gait.  Uses cane.       Procedures    NIH Stroke Scale    1a  Level of consciousness: 0=alert; keenly responsive   1b. LOC questions:  0=Answers both questions correctly    1c. LOC commands: 0=Performs both tasks correctly   2.  Best Gaze: 0=normal   3. Visual: 0=No visual loss   4. Facial Palsy: 0=Normal symmetric movement   5a. Motor left arm: 1=Drift, limb holds 90 (or 45) degrees but drifts down before full 10 seconds: does not hit bed   5b.  Motor right arm: 0=No drift, limb holds 90 (or 45) degrees for full 10 seconds   6a. Motor left le=Drift, limb holds 90 (or 45) degrees but drifts down before full 10 seconds: does not hit bed   6b  Motor right le=No drift, limb holds 90 (or 45) degrees for full  10 seconds   7. Limb Ataxia: 0=Absent   8.  Sensory: 1=Mild to moderate sensory loss; patient feels pinprick is less sharp or is dull on the affected side; there is a loss of superficial pain with pinprick but patient is aware She is being touched   9. Best Language:  0=No aphasia, normal   10. Dysarthria: 0=Normal   11. Extinction and Inattention: 0=No abnormality    Total:   3         Modified [place] Scale: 3          0  No Symptoms    1 No significant disability. Able to carry out all usual activities, despite some symptoms.    2 Slight disability. Able to look after own affairs without assistance, but unable to carry out all previous activities.    3 Moderate disability. Requires some help, but able to walk unassisted.    4 Moderately severe disability. Unable to attend to own bodily needs without assistance, and unable to walk unassisted.    5 Severe disability. Requires constant nursing care and attention, bedridden, incontinent.    6 Dead          PHQ-9 Depression Screening  Little interest or pleasure in doing things? Several days   Feeling down, depressed, or hopeless? Several days   PHQ-2 Total Score 2   Trouble falling or staying asleep, or sleeping too much? Several days   Feeling tired or having little energy? Several days   Poor appetite or overeating? Several days   Feeling bad about yourself - or that you are a failure or have let yourself or your family down? Several days   Trouble concentrating on things, such as reading the newspaper or watching television? Several days   Moving or speaking so slowly that other people could have noticed? Or the opposite - being so fidgety or restless that you have been moving around a lot more than usual? Several days   Thoughts that you would be better off dead, or of hurting yourself in some way? Not at all   PHQ-9 Total Score 8   If you checked off any problems, how difficult have these problems made it for you to do your work, take care of things at home, or  get along with other people? Extremely difficult          Imaging Resluts:     MRI Cervical Spine Without Contrast  Result Date: 2/27/2025  1. Mild multilevel degenerative disc disease. Mild central canal stenosis and moderate bilateral neuroforaminal stenosis at C5/6. 2. No acute fracture or malalignment.     This report was finalized on 2/27/2025 2:08 PM by Dr. Santy Rivera MD.      MRI Lumbar Spine Without Contrast  Result Date: 2/27/2025  1. Multilevel degenerative disc disease with facet arthropathy. Central canal and neuroforaminal stenosis at multiple levels detailed above. 2. Superimposed disc protrusion at the L5/S1 level which contributes to central canal stenosis and produces mass effect on the left S1 nerve root centrally. 3. No acute fracture or traumatic malalignment.  This report was finalized on 2/27/2025 2:07 PM by Dr. Santy Rivera MD.      MRI Brain Without Contrast  Result Date: 2/27/2025  1.  No acute intracranial abnormality. No acute infarct. 2.  Mild chronic small vessel ischemic disease. Question old subcortical infarct left precentral gyrus. 3.  Mild right mastoid effusion.  This report was finalized on 2/27/2025 2:05 PM by Dr. Santy Rivera MD.      MRI Thoracic Spine Without Contrast  Result Date: 2/27/2025  1.  No cord lesions identified. 2.  No fracture or malalignment. 3.  Mild multilevel degenerative disc disease with mild annular disc bulges mid-lower thoracic levels but resulting in no significant stenosis.  This report was finalized on 2/27/2025 2:02 PM by Dr. Santy Rivera MD.         Laboratory Results:    Lab Results   Component Value Date    HGBA1C 5.60 05/19/2024     Lab Results   Component Value Date    WBC 6.00 05/19/2024    HGB 13.2 05/19/2024    HCT 40.3 05/19/2024    MCV 88.8 05/19/2024     05/19/2024      Lab Results   Component Value Date    GLUCOSE 178 (H) 05/19/2024    BUN 11 05/19/2024    CREATININE 0.84 05/19/2024    BCR 13.1 05/19/2024    K 3.8  05/19/2024    CO2 24.9 05/19/2024    CALCIUM 9.1 05/19/2024    ALBUMIN 4.1 05/19/2024    AST 12 05/19/2024    ALT 10 05/19/2024      Lab Results   Component Value Date    CHOL 198 05/19/2024     Lab Results   Component Value Date    TRIG 142 05/19/2024     Lab Results   Component Value Date    HDL 38 (L) 05/19/2024     Lab Results   Component Value Date     (H) 05/19/2024      Lab Results   Component Value Date    CUIIPPNA40 397 05/19/2024     Lab Results   Component Value Date    FOLATE 6.69 05/19/2024     Lab Results   Component Value Date    TSH 2.080 05/19/2024       Assessment / Plan      Assessment/Plan:   Diagnoses and all orders for this visit:    1. Personal history of TIA (transient ischemic attack) (Primary)  -     Blood Pressure Device  -     Ambulatory Referral to Physical Therapy for Evaluation & Treatment  -     Ambulatory Referral to Occupational Therapy for Evaluation & Treatment    2. Primary hypertension  -     Blood Pressure Device    3. Left spastic hemiplegia  -     Ambulatory Referral to Physical Therapy for Evaluation & Treatment  -     Ambulatory Referral to Occupational Therapy for Evaluation & Treatment    4. Hemiplegia of left nondominant side as late effect of cerebrovascular disease, unspecified cerebrovascular disease type, unspecified hemiplegia type    5. Mixed hyperlipidemia       -Holter monitor ordered, results pending  -Continue Plavix and high intensity statin  -BP today 159/61, I have asked  her to monitor her BP at home and keep a log to bring with her to future PCP or clinic visits  -Stroke labs per PCP  -Patient is being seen by behavioral health, no SI/HI  -New referral for PT/OT  -Patient has been evaluated for sleep apnea, states she wears nocturnal O2    Discussed the importance of medication compliance and lifestyle modifications (adequate blood pressure control, adequate control of hyperlipidemia, adequate glycemic control, increase physical activity, and  healthy diet) to help reduce the risk of future cerebrovascular events.  Also discussed the signs symptoms that would warrant the patient return back to the emergency department including unilateral weakness, unilateral numbness, visual disturbances, loss of balance, speech difficulties, and/or a sudden severe headache.      Blood Pressure control to < 130/80  Goal LDL <70-recommend high dose statins  Goal A1c < 7.0  Call 911 for any stroke symptoms    Follow Up:   Return in about 6 months (around 9/21/2025).      [facility]    This document has been electronically signed by LISA Gómez  March 21, 2025 14:33 EDT    Please note that portions of this note were completed with a voice recognition program. Efforts were made to edit dictation, but occasionally words are mistranscribed.

## 2025-03-19 LAB
CV ZIO BASELINE AVG BPM: 72
CV ZIO BASELINE BPM HIGH: 133
CV ZIO BASELINE BPM LOW: 46

## 2025-03-21 ENCOUNTER — OFFICE VISIT (OUTPATIENT)
Dept: NEUROLOGY | Facility: CLINIC | Age: 55
End: 2025-03-21
Payer: COMMERCIAL

## 2025-03-21 VITALS
OXYGEN SATURATION: 96 % | HEART RATE: 67 BPM | SYSTOLIC BLOOD PRESSURE: 159 MMHG | WEIGHT: 193.2 LBS | BODY MASS INDEX: 37.73 KG/M2 | DIASTOLIC BLOOD PRESSURE: 61 MMHG

## 2025-03-21 DIAGNOSIS — G81.14 LEFT SPASTIC HEMIPLEGIA: ICD-10-CM

## 2025-03-21 DIAGNOSIS — I69.954 HEMIPLEGIA OF LEFT NONDOMINANT SIDE AS LATE EFFECT OF CEREBROVASCULAR DISEASE, UNSPECIFIED CEREBROVASCULAR DISEASE TYPE, UNSPECIFIED HEMIPLEGIA TYPE: ICD-10-CM

## 2025-03-21 DIAGNOSIS — I10 PRIMARY HYPERTENSION: ICD-10-CM

## 2025-03-21 DIAGNOSIS — Z86.73 PERSONAL HISTORY OF TIA (TRANSIENT ISCHEMIC ATTACK): Primary | ICD-10-CM

## 2025-03-21 DIAGNOSIS — E78.2 MIXED HYPERLIPIDEMIA: ICD-10-CM
